# Patient Record
Sex: FEMALE | Race: WHITE | NOT HISPANIC OR LATINO | Employment: OTHER | URBAN - METROPOLITAN AREA
[De-identification: names, ages, dates, MRNs, and addresses within clinical notes are randomized per-mention and may not be internally consistent; named-entity substitution may affect disease eponyms.]

---

## 2023-03-20 DIAGNOSIS — Z79.4 TYPE 2 DIABETES MELLITUS WITH STAGE 3A CHRONIC KIDNEY DISEASE, WITH LONG-TERM CURRENT USE OF INSULIN (MULTI): Primary | ICD-10-CM

## 2023-03-20 DIAGNOSIS — E11.22 TYPE 2 DIABETES MELLITUS WITH STAGE 3A CHRONIC KIDNEY DISEASE, WITH LONG-TERM CURRENT USE OF INSULIN (MULTI): Primary | ICD-10-CM

## 2023-03-20 DIAGNOSIS — N18.31 TYPE 2 DIABETES MELLITUS WITH STAGE 3A CHRONIC KIDNEY DISEASE, WITH LONG-TERM CURRENT USE OF INSULIN (MULTI): Primary | ICD-10-CM

## 2023-03-20 PROBLEM — R53.83 FATIGUE: Status: ACTIVE | Noted: 2023-03-20

## 2023-03-20 PROBLEM — M54.31 SCIATICA OF RIGHT SIDE: Status: ACTIVE | Noted: 2023-03-20

## 2023-03-20 PROBLEM — I10 HYPERTENSION: Status: ACTIVE | Noted: 2023-03-20

## 2023-03-20 PROBLEM — N18.30 CKD STAGE 3 SECONDARY TO DIABETES (MULTI): Status: ACTIVE | Noted: 2023-03-20

## 2023-03-20 PROBLEM — E11.9 DIABETES MELLITUS, TYPE 2 (MULTI): Status: ACTIVE | Noted: 2023-03-20

## 2023-03-20 PROBLEM — M19.019 SHOULDER ARTHRITIS: Status: ACTIVE | Noted: 2023-03-20

## 2023-03-20 PROBLEM — E78.5 HYPERLIPIDEMIA: Status: ACTIVE | Noted: 2023-03-20

## 2023-03-20 RX ORDER — BLOOD-GLUCOSE METER
3 EACH MISCELLANEOUS
COMMUNITY
Start: 2021-09-10 | End: 2024-04-01 | Stop reason: SDUPTHER

## 2023-03-20 RX ORDER — INSULIN HUMAN 100 [IU]/ML
INJECTION, SUSPENSION SUBCUTANEOUS
COMMUNITY
Start: 2017-01-24 | End: 2024-01-26 | Stop reason: SDUPTHER

## 2023-03-20 RX ORDER — PRAVASTATIN SODIUM 80 MG/1
1 TABLET ORAL DAILY
COMMUNITY
Start: 2017-01-23 | End: 2024-03-04 | Stop reason: SDUPTHER

## 2023-03-20 RX ORDER — DULAGLUTIDE 1.5 MG/.5ML
1.5 INJECTION, SOLUTION SUBCUTANEOUS
COMMUNITY
Start: 2022-05-04 | End: 2023-10-13 | Stop reason: ALTCHOICE

## 2023-03-20 RX ORDER — GLIPIZIDE 10 MG/1
10 TABLET ORAL
COMMUNITY
Start: 2020-08-07 | End: 2024-05-24 | Stop reason: ALTCHOICE

## 2023-03-20 RX ORDER — PEN NEEDLE, DIABETIC 29 G X1/2"
NEEDLE, DISPOSABLE MISCELLANEOUS AS NEEDED
COMMUNITY
Start: 2023-02-06 | End: 2023-11-02

## 2023-03-20 RX ORDER — INSULIN HUMAN 100 [IU]/ML
INJECTION, SUSPENSION SUBCUTANEOUS
Qty: 40 ML | Refills: 3 | Status: SHIPPED | OUTPATIENT
Start: 2023-03-20 | End: 2024-03-19

## 2023-03-20 RX ORDER — METFORMIN HYDROCHLORIDE 1000 MG/1
1000 TABLET ORAL
COMMUNITY
Start: 2017-01-23 | End: 2023-05-29

## 2023-03-20 RX ORDER — LISINOPRIL AND HYDROCHLOROTHIAZIDE 20; 25 MG/1; MG/1
1 TABLET ORAL DAILY
COMMUNITY
Start: 2017-01-24 | End: 2024-03-04 | Stop reason: SDUPTHER

## 2023-03-21 LAB
ERYTHROCYTE DISTRIBUTION WIDTH (RATIO) BY AUTOMATED COUNT: 11.9 % (ref 11.5–14.5)
ERYTHROCYTE MEAN CORPUSCULAR HEMOGLOBIN CONCENTRATION (G/DL) BY AUTOMATED: 32.8 G/DL (ref 32–36)
ERYTHROCYTE MEAN CORPUSCULAR VOLUME (FL) BY AUTOMATED COUNT: 89 FL (ref 80–100)
ERYTHROCYTES (10*6/UL) IN BLOOD BY AUTOMATED COUNT: 4.98 X10E12/L (ref 4–5.2)
ESTIMATED AVERAGE GLUCOSE FOR HBA1C: 209 MG/DL
HEMATOCRIT (%) IN BLOOD BY AUTOMATED COUNT: 44.2 % (ref 36–46)
HEMOGLOBIN (G/DL) IN BLOOD: 14.5 G/DL (ref 12–16)
HEMOGLOBIN A1C/HEMOGLOBIN TOTAL IN BLOOD: 8.9 %
LEUKOCYTES (10*3/UL) IN BLOOD BY AUTOMATED COUNT: 11.7 X10E9/L (ref 4.4–11.3)
NRBC (PER 100 WBCS) BY AUTOMATED COUNT: 0 /100 WBC (ref 0–0)
PLATELETS (10*3/UL) IN BLOOD AUTOMATED COUNT: 338 X10E9/L (ref 150–450)

## 2023-03-22 LAB
ALANINE AMINOTRANSFERASE (SGPT) (U/L) IN SER/PLAS: 14 U/L (ref 7–45)
ALBUMIN (G/DL) IN SER/PLAS: 4.4 G/DL (ref 3.4–5)
ALBUMIN (G/DL) IN SER/PLAS: 4.4 G/DL (ref 3.4–5)
ALBUMIN (MG/L) IN URINE: <7 MG/L
ALBUMIN/CREATININE (UG/MG) IN URINE: NORMAL UG/MG CRT (ref 0–30)
ALKALINE PHOSPHATASE (U/L) IN SER/PLAS: 70 U/L (ref 33–136)
ANION GAP IN SER/PLAS: 17 MMOL/L (ref 10–20)
ANION GAP IN SER/PLAS: 18 MMOL/L (ref 10–20)
ASPARTATE AMINOTRANSFERASE (SGOT) (U/L) IN SER/PLAS: 17 U/L (ref 9–39)
BILIRUBIN TOTAL (MG/DL) IN SER/PLAS: 0.6 MG/DL (ref 0–1.2)
CALCIDIOL (25 OH VITAMIN D3) (NG/ML) IN SER/PLAS: 50 NG/ML
CALCIUM (MG/DL) IN SER/PLAS: 9.9 MG/DL (ref 8.6–10.6)
CALCIUM (MG/DL) IN SER/PLAS: 9.9 MG/DL (ref 8.6–10.6)
CARBON DIOXIDE, TOTAL (MMOL/L) IN SER/PLAS: 25 MMOL/L (ref 21–32)
CARBON DIOXIDE, TOTAL (MMOL/L) IN SER/PLAS: 26 MMOL/L (ref 21–32)
CHLORIDE (MMOL/L) IN SER/PLAS: 96 MMOL/L (ref 98–107)
CHLORIDE (MMOL/L) IN SER/PLAS: 96 MMOL/L (ref 98–107)
CHOLESTEROL (MG/DL) IN SER/PLAS: 207 MG/DL (ref 0–199)
CHOLESTEROL IN HDL (MG/DL) IN SER/PLAS: 53.8 MG/DL
CHOLESTEROL/HDL RATIO: 3.8
CREATININE (MG/DL) IN SER/PLAS: 1.7 MG/DL (ref 0.5–1.05)
CREATININE (MG/DL) IN SER/PLAS: 1.76 MG/DL (ref 0.5–1.05)
CREATININE (MG/DL) IN URINE: 136 MG/DL (ref 20–320)
GFR FEMALE: 29 ML/MIN/1.73M2
GFR FEMALE: 31 ML/MIN/1.73M2
GLUCOSE (MG/DL) IN SER/PLAS: 205 MG/DL (ref 74–99)
GLUCOSE (MG/DL) IN SER/PLAS: 205 MG/DL (ref 74–99)
LDL: 109 MG/DL (ref 0–99)
NON HDL CHOLESTEROL: 153 MG/DL
PARATHYRIN INTACT (PG/ML) IN SER/PLAS: 55 PG/ML (ref 18.5–88)
PHOSPHATE (MG/DL) IN SER/PLAS: 4.3 MG/DL (ref 2.5–4.9)
POTASSIUM (MMOL/L) IN SER/PLAS: 4.2 MMOL/L (ref 3.5–5.3)
POTASSIUM (MMOL/L) IN SER/PLAS: 4.3 MMOL/L (ref 3.5–5.3)
PROTEIN TOTAL: 7.1 G/DL (ref 6.4–8.2)
SODIUM (MMOL/L) IN SER/PLAS: 135 MMOL/L (ref 136–145)
SODIUM (MMOL/L) IN SER/PLAS: 135 MMOL/L (ref 136–145)
TRIGLYCERIDE (MG/DL) IN SER/PLAS: 223 MG/DL (ref 0–149)
UREA NITROGEN (MG/DL) IN SER/PLAS: 35 MG/DL (ref 6–23)
UREA NITROGEN (MG/DL) IN SER/PLAS: 35 MG/DL (ref 6–23)
VLDL: 45 MG/DL (ref 0–40)

## 2023-05-24 DIAGNOSIS — N18.31 TYPE 2 DIABETES MELLITUS WITH STAGE 3A CHRONIC KIDNEY DISEASE, WITH LONG-TERM CURRENT USE OF INSULIN (MULTI): Primary | ICD-10-CM

## 2023-05-24 DIAGNOSIS — E11.22 TYPE 2 DIABETES MELLITUS WITH STAGE 3A CHRONIC KIDNEY DISEASE, WITH LONG-TERM CURRENT USE OF INSULIN (MULTI): Primary | ICD-10-CM

## 2023-05-24 DIAGNOSIS — Z79.4 TYPE 2 DIABETES MELLITUS WITH STAGE 3A CHRONIC KIDNEY DISEASE, WITH LONG-TERM CURRENT USE OF INSULIN (MULTI): Primary | ICD-10-CM

## 2023-05-29 RX ORDER — METFORMIN HYDROCHLORIDE 1000 MG/1
TABLET ORAL
Qty: 90 TABLET | Refills: 3 | Status: SHIPPED | OUTPATIENT
Start: 2023-05-29 | End: 2024-05-24 | Stop reason: ALTCHOICE

## 2023-06-28 LAB
ALANINE AMINOTRANSFERASE (SGPT) (U/L) IN SER/PLAS: 10 U/L (ref 7–45)
ALBUMIN (G/DL) IN SER/PLAS: 4.3 G/DL (ref 3.4–5)
ALKALINE PHOSPHATASE (U/L) IN SER/PLAS: 67 U/L (ref 33–136)
ANION GAP IN SER/PLAS: 17 MMOL/L (ref 10–20)
ASPARTATE AMINOTRANSFERASE (SGOT) (U/L) IN SER/PLAS: 14 U/L (ref 9–39)
BILIRUBIN TOTAL (MG/DL) IN SER/PLAS: 0.5 MG/DL (ref 0–1.2)
CALCIUM (MG/DL) IN SER/PLAS: 10.4 MG/DL (ref 8.6–10.6)
CARBON DIOXIDE, TOTAL (MMOL/L) IN SER/PLAS: 27 MMOL/L (ref 21–32)
CHLORIDE (MMOL/L) IN SER/PLAS: 97 MMOL/L (ref 98–107)
CREATININE (MG/DL) IN SER/PLAS: 1.48 MG/DL (ref 0.5–1.05)
ESTIMATED AVERAGE GLUCOSE FOR HBA1C: 177 MG/DL
GFR FEMALE: 36 ML/MIN/1.73M2
GLUCOSE (MG/DL) IN SER/PLAS: 131 MG/DL (ref 74–99)
HEMOGLOBIN A1C/HEMOGLOBIN TOTAL IN BLOOD: 7.8 %
POTASSIUM (MMOL/L) IN SER/PLAS: 4.4 MMOL/L (ref 3.5–5.3)
PROTEIN TOTAL: 7.3 G/DL (ref 6.4–8.2)
SODIUM (MMOL/L) IN SER/PLAS: 137 MMOL/L (ref 136–145)
UREA NITROGEN (MG/DL) IN SER/PLAS: 34 MG/DL (ref 6–23)

## 2023-09-10 PROBLEM — R94.31 ABNORMAL EKG: Status: ACTIVE | Noted: 2023-09-10

## 2023-09-10 PROBLEM — Z78.0 MENOPAUSE: Status: ACTIVE | Noted: 2023-09-10

## 2023-09-10 RX ORDER — DULAGLUTIDE 3 MG/.5ML
INJECTION, SOLUTION SUBCUTANEOUS
COMMUNITY
Start: 2023-05-05

## 2023-09-28 LAB
ALBUMIN (MG/L) IN URINE: 9.4 MG/L
ALBUMIN/CREATININE (UG/MG) IN URINE: 6.7 UG/MG CRT (ref 0–30)
CREATININE (MG/DL) IN URINE: 141 MG/DL (ref 20–320)
ESTIMATED AVERAGE GLUCOSE FOR HBA1C: 217 MG/DL
HEMOGLOBIN A1C/HEMOGLOBIN TOTAL IN BLOOD: 9.2 %

## 2023-09-29 LAB
ALANINE AMINOTRANSFERASE (SGPT) (U/L) IN SER/PLAS: 10 U/L (ref 7–45)
ALBUMIN (G/DL) IN SER/PLAS: 4 G/DL (ref 3.4–5)
ALBUMIN (G/DL) IN SER/PLAS: 4.1 G/DL (ref 3.4–5)
ALKALINE PHOSPHATASE (U/L) IN SER/PLAS: 67 U/L (ref 33–136)
ANION GAP IN SER/PLAS: 18 MMOL/L (ref 10–20)
ANION GAP IN SER/PLAS: 18 MMOL/L (ref 10–20)
ASPARTATE AMINOTRANSFERASE (SGOT) (U/L) IN SER/PLAS: 12 U/L (ref 9–39)
BILIRUBIN TOTAL (MG/DL) IN SER/PLAS: 0.6 MG/DL (ref 0–1.2)
CALCIDIOL (25 OH VITAMIN D3) (NG/ML) IN SER/PLAS: 34 NG/ML
CALCIUM (MG/DL) IN SER/PLAS: 10.6 MG/DL (ref 8.6–10.6)
CALCIUM (MG/DL) IN SER/PLAS: 10.6 MG/DL (ref 8.6–10.6)
CARBON DIOXIDE, TOTAL (MMOL/L) IN SER/PLAS: 24 MMOL/L (ref 21–32)
CARBON DIOXIDE, TOTAL (MMOL/L) IN SER/PLAS: 25 MMOL/L (ref 21–32)
CHLORIDE (MMOL/L) IN SER/PLAS: 99 MMOL/L (ref 98–107)
CHLORIDE (MMOL/L) IN SER/PLAS: 99 MMOL/L (ref 98–107)
CREATININE (MG/DL) IN SER/PLAS: 1.46 MG/DL (ref 0.5–1.05)
CREATININE (MG/DL) IN SER/PLAS: 1.48 MG/DL (ref 0.5–1.05)
GFR FEMALE: 36 ML/MIN/1.73M2
GFR FEMALE: 37 ML/MIN/1.73M2
GLUCOSE (MG/DL) IN SER/PLAS: 224 MG/DL (ref 74–99)
GLUCOSE (MG/DL) IN SER/PLAS: 224 MG/DL (ref 74–99)
PARATHYRIN INTACT (PG/ML) IN SER/PLAS: 18.4 PG/ML (ref 18.5–88)
PHOSPHATE (MG/DL) IN SER/PLAS: 3.4 MG/DL (ref 2.5–4.9)
POTASSIUM (MMOL/L) IN SER/PLAS: 3.8 MMOL/L (ref 3.5–5.3)
POTASSIUM (MMOL/L) IN SER/PLAS: 3.8 MMOL/L (ref 3.5–5.3)
PROTEIN TOTAL: 6.9 G/DL (ref 6.4–8.2)
SODIUM (MMOL/L) IN SER/PLAS: 137 MMOL/L (ref 136–145)
SODIUM (MMOL/L) IN SER/PLAS: 138 MMOL/L (ref 136–145)
UREA NITROGEN (MG/DL) IN SER/PLAS: 31 MG/DL (ref 6–23)
UREA NITROGEN (MG/DL) IN SER/PLAS: 32 MG/DL (ref 6–23)

## 2023-10-13 ENCOUNTER — OFFICE VISIT (OUTPATIENT)
Dept: ENDOCRINOLOGY | Facility: CLINIC | Age: 78
End: 2023-10-13
Payer: MEDICARE

## 2023-10-13 VITALS
SYSTOLIC BLOOD PRESSURE: 120 MMHG | BODY MASS INDEX: 29.02 KG/M2 | HEART RATE: 68 BPM | WEIGHT: 174.2 LBS | HEIGHT: 65 IN | DIASTOLIC BLOOD PRESSURE: 70 MMHG | RESPIRATION RATE: 16 BRPM

## 2023-10-13 DIAGNOSIS — Z79.4 TYPE 2 DIABETES MELLITUS WITHOUT COMPLICATION, WITH LONG-TERM CURRENT USE OF INSULIN (MULTI): Primary | ICD-10-CM

## 2023-10-13 DIAGNOSIS — E11.9 TYPE 2 DIABETES MELLITUS WITHOUT COMPLICATION, WITH LONG-TERM CURRENT USE OF INSULIN (MULTI): Primary | ICD-10-CM

## 2023-10-13 DIAGNOSIS — I10 HYPERTENSION, UNSPECIFIED TYPE: ICD-10-CM

## 2023-10-13 DIAGNOSIS — E78.5 HYPERLIPIDEMIA, UNSPECIFIED HYPERLIPIDEMIA TYPE: ICD-10-CM

## 2023-10-13 PROCEDURE — 1160F RVW MEDS BY RX/DR IN RCRD: CPT | Performed by: INTERNAL MEDICINE

## 2023-10-13 PROCEDURE — 3074F SYST BP LT 130 MM HG: CPT | Performed by: INTERNAL MEDICINE

## 2023-10-13 PROCEDURE — 99214 OFFICE O/P EST MOD 30 MIN: CPT | Performed by: INTERNAL MEDICINE

## 2023-10-13 PROCEDURE — 3078F DIAST BP <80 MM HG: CPT | Performed by: INTERNAL MEDICINE

## 2023-10-13 PROCEDURE — 1036F TOBACCO NON-USER: CPT | Performed by: INTERNAL MEDICINE

## 2023-10-13 PROCEDURE — 1159F MED LIST DOCD IN RCRD: CPT | Performed by: INTERNAL MEDICINE

## 2023-10-13 ASSESSMENT — ENCOUNTER SYMPTOMS
VOMITING: 0
CHILLS: 0
FATIGUE: 0
NAUSEA: 0
COUGH: 0
PALPITATIONS: 0
FEVER: 0
DIARRHEA: 0
HEADACHES: 0
SHORTNESS OF BREATH: 0

## 2023-10-13 NOTE — PATIENT INSTRUCTIONS
Cut back on fruit  Take glipizide with breakfast and dinner  Follow up in 3 months  Do not refill NPH, let us know if low and will switch insulin type

## 2023-10-13 NOTE — PROGRESS NOTES
"Subjective   Patient ID: Verenice Ruiz is a 78 y.o. female who presents for Diabetes, Hyperlipidemia, and Hypertension.  HPI  Current on trulicity 3 mg  Metformin 1000 every day  Nph 30 am only   Glipizide bid  Since last visit increased glipizide to bid but somehow decided to take at lunch and hs....feels low in the middle of the night sometimes and during the day sugars very high.   Admits also eating a lot of fruit over the summer.  Still active with rescue dog    Review of Systems   Constitutional:  Negative for chills, fatigue and fever.   Respiratory:  Negative for cough and shortness of breath.    Cardiovascular:  Negative for chest pain and palpitations.   Gastrointestinal:  Negative for diarrhea, nausea and vomiting.   Neurological:  Negative for headaches.       Objective    10/13/2023 12:48 PM   /70   Pulse 68   Resp 16   Weight 79 kg (174 lb 3.2 oz)   Height 1.651 m (5' 5\")       Physical Exam  Constitutional:       Appearance: Normal appearance. She is overweight.   HENT:      Head: Normocephalic and atraumatic.   Neck:      Thyroid: No thyroid mass, thyromegaly or thyroid tenderness.   Cardiovascular:      Rate and Rhythm: Normal rate and regular rhythm.      Heart sounds: No murmur heard.     No gallop.   Pulmonary:      Effort: Pulmonary effort is normal.      Breath sounds: Normal breath sounds.   Abdominal:      Palpations: Abdomen is soft.      Comments: benign   Neurological:      General: No focal deficit present.      Mental Status: She is alert and oriented to person, place, and time.      Deep Tendon Reflexes: Reflexes are normal and symmetric.   Psychiatric:         Behavior: Behavior is cooperative.         Assessment/Plan   Problem List Items Addressed This Visit             ICD-10-CM    Diabetes mellitus, type 2 (CMS/HCC) - Primary E11.9    Relevant Orders    Hemoglobin A1C    Comprehensive Metabolic Panel    Hyperlipidemia E78.5    Hypertension I10   Dm2:  A1c way up at 9.2 " due to during the day numbers but feels low at night  Discussed importance of checking when feeling low, also needs to cut back on fruit  Reviewed improved long acting insulins, wants to use up NPH first and has 8 vials  Sglt2 and option, but will first move glipizide to breakfast and dinner and work on eating  Htn:  Bp controlled  Lipids:  Continue statin  Follow up in 3 months

## 2023-11-02 DIAGNOSIS — Z79.4 TYPE 2 DIABETES MELLITUS WITH OTHER SPECIFIED COMPLICATION, WITH LONG-TERM CURRENT USE OF INSULIN (MULTI): Primary | ICD-10-CM

## 2023-11-02 DIAGNOSIS — E11.69 TYPE 2 DIABETES MELLITUS WITH OTHER SPECIFIED COMPLICATION, WITH LONG-TERM CURRENT USE OF INSULIN (MULTI): Primary | ICD-10-CM

## 2023-11-02 RX ORDER — PEN NEEDLE, DIABETIC 29 G X1/2"
NEEDLE, DISPOSABLE MISCELLANEOUS
Qty: 180 EACH | Refills: 3 | Status: SHIPPED | OUTPATIENT
Start: 2023-11-02

## 2024-01-24 ENCOUNTER — LAB (OUTPATIENT)
Dept: LAB | Facility: LAB | Age: 79
End: 2024-01-24
Payer: MEDICARE

## 2024-01-24 DIAGNOSIS — E11.9 TYPE 2 DIABETES MELLITUS WITHOUT COMPLICATION, WITH LONG-TERM CURRENT USE OF INSULIN (MULTI): ICD-10-CM

## 2024-01-24 DIAGNOSIS — Z79.4 TYPE 2 DIABETES MELLITUS WITHOUT COMPLICATION, WITH LONG-TERM CURRENT USE OF INSULIN (MULTI): ICD-10-CM

## 2024-01-24 LAB
ALBUMIN SERPL BCP-MCNC: 4 G/DL (ref 3.4–5)
ALP SERPL-CCNC: 71 U/L (ref 33–136)
ALT SERPL W P-5'-P-CCNC: 10 U/L (ref 7–45)
ANION GAP SERPL CALC-SCNC: 15 MMOL/L (ref 10–20)
AST SERPL W P-5'-P-CCNC: 13 U/L (ref 9–39)
BILIRUB SERPL-MCNC: 0.4 MG/DL (ref 0–1.2)
BUN SERPL-MCNC: 38 MG/DL (ref 6–23)
CALCIUM SERPL-MCNC: 9.7 MG/DL (ref 8.6–10.6)
CHLORIDE SERPL-SCNC: 95 MMOL/L (ref 98–107)
CO2 SERPL-SCNC: 27 MMOL/L (ref 21–32)
CREAT SERPL-MCNC: 1.82 MG/DL (ref 0.5–1.05)
EGFRCR SERPLBLD CKD-EPI 2021: 28 ML/MIN/1.73M*2
EST. AVERAGE GLUCOSE BLD GHB EST-MCNC: 203 MG/DL
GLUCOSE SERPL-MCNC: 339 MG/DL (ref 74–99)
HBA1C MFR BLD: 8.7 %
POTASSIUM SERPL-SCNC: 4.5 MMOL/L (ref 3.5–5.3)
PROT SERPL-MCNC: 7 G/DL (ref 6.4–8.2)
SODIUM SERPL-SCNC: 132 MMOL/L (ref 136–145)

## 2024-01-24 PROCEDURE — 36415 COLL VENOUS BLD VENIPUNCTURE: CPT

## 2024-01-24 PROCEDURE — 80053 COMPREHEN METABOLIC PANEL: CPT

## 2024-01-24 PROCEDURE — 83036 HEMOGLOBIN GLYCOSYLATED A1C: CPT

## 2024-01-26 ENCOUNTER — OFFICE VISIT (OUTPATIENT)
Dept: ENDOCRINOLOGY | Facility: CLINIC | Age: 79
End: 2024-01-26
Payer: MEDICARE

## 2024-01-26 VITALS
WEIGHT: 169.4 LBS | RESPIRATION RATE: 16 BRPM | SYSTOLIC BLOOD PRESSURE: 120 MMHG | HEART RATE: 92 BPM | DIASTOLIC BLOOD PRESSURE: 70 MMHG | BODY MASS INDEX: 28.22 KG/M2 | HEIGHT: 65 IN

## 2024-01-26 DIAGNOSIS — E11.9 TYPE 2 DIABETES MELLITUS WITHOUT COMPLICATION, UNSPECIFIED WHETHER LONG TERM INSULIN USE (MULTI): Primary | ICD-10-CM

## 2024-01-26 DIAGNOSIS — I10 HYPERTENSION, UNSPECIFIED TYPE: ICD-10-CM

## 2024-01-26 DIAGNOSIS — E78.5 HYPERLIPIDEMIA, UNSPECIFIED HYPERLIPIDEMIA TYPE: ICD-10-CM

## 2024-01-26 PROCEDURE — 1036F TOBACCO NON-USER: CPT | Performed by: INTERNAL MEDICINE

## 2024-01-26 PROCEDURE — 99214 OFFICE O/P EST MOD 30 MIN: CPT | Performed by: INTERNAL MEDICINE

## 2024-01-26 PROCEDURE — 1160F RVW MEDS BY RX/DR IN RCRD: CPT | Performed by: INTERNAL MEDICINE

## 2024-01-26 PROCEDURE — 3074F SYST BP LT 130 MM HG: CPT | Performed by: INTERNAL MEDICINE

## 2024-01-26 PROCEDURE — 1159F MED LIST DOCD IN RCRD: CPT | Performed by: INTERNAL MEDICINE

## 2024-01-26 PROCEDURE — 3078F DIAST BP <80 MM HG: CPT | Performed by: INTERNAL MEDICINE

## 2024-01-26 ASSESSMENT — ENCOUNTER SYMPTOMS
FEVER: 0
COUGH: 0
VOMITING: 0
CHILLS: 0
FATIGUE: 0
NAUSEA: 0
DIARRHEA: 0
HEADACHES: 0
PALPITATIONS: 0
SHORTNESS OF BREATH: 0

## 2024-01-26 NOTE — PROGRESS NOTES
"Endocrinology: Follow up visit  Subjective   Patient ID: Verenice Ruiz is a 78 y.o. female who presents for Diabetes (Type 2 ), Hyperlipidemia, and Hypertension.    PCP: Nyla Khan MD    HPI    Current on trulicity 3 mg  Metformin 1000 every day  Nph 30 am only   Glipizide bid  Last visit discussed proper timing of glipizide.  Today admits forgetting second dose completely.  Am sugars are ok but hs numbers in 200-300 often.  Feels ok.  No complaints.  Off track with eating over the winter  Review of Systems   Constitutional:  Negative for chills, fatigue and fever.   Respiratory:  Negative for cough and shortness of breath.    Cardiovascular:  Negative for chest pain and palpitations.   Gastrointestinal:  Negative for diarrhea, nausea and vomiting.   Neurological:  Negative for headaches.       Patient Active Problem List   Diagnosis    CKD stage 3 secondary to diabetes (CMS/HCC)    Diabetes mellitus, type 2 (CMS/HCC)    Fatigue    Hyperlipidemia    Hypertension    Sciatica of right side    Shoulder arthritis    Abnormal EKG    Menopause        Home Meds:  Current Outpatient Medications   Medication Instructions    BD Insulin Syringe Ultra-Fine 0.5 mL 31 gauge x 5/16\" syringe USE WITH INSULIN TWICE A DAY    glipiZIDE (GLUCOTROL) 10 mg, oral, 2 times daily before meals    HumuLIN N NPH U-100 Insulin 100 unit/mL injection subcutaneous, 2 times daily before meals    insulin NPH, Isophane, (HumuLIN N NPH U-100 Insulin) 100 unit/mL injection INJECT 30 UNITS IN THE MORNING AND 14 UNITS IN THE EVENING    lisinopriL-hydrochlorothiazide 20-25 mg tablet 1 tablet, oral, Daily    metFORMIN (Glucophage) 1,000 mg tablet TAKE 1 TABLET DAILY ONLY    OneTouch Verio test strips 3 %    pravastatin (Pravachol) 80 mg tablet 1 tablet, oral, Daily    Trulicity 3 mg/0.5 mL pen injector Use as directed.        Allergies   Allergen Reactions    Allerg Xt,D.Farinae-D.Pteronys Other    Cat Hair Standardized Allergenic Extract Other    " "Rosuvastatin Other     Severe myalgia    Simvastatin Other     Severe shoulder upper arm pain bilateral        Objective   Vitals:    01/26/24 1333   BP: 120/70   Pulse: 92   Resp: 16      Vitals:    01/26/24 1333   Weight: 76.8 kg (169 lb 6.4 oz)      Body mass index is 28.19 kg/m².   Physical Exam  Constitutional:       Appearance: Normal appearance. She is overweight.   HENT:      Head: Normocephalic and atraumatic.   Neck:      Thyroid: No thyroid mass, thyromegaly or thyroid tenderness.   Cardiovascular:      Rate and Rhythm: Normal rate and regular rhythm.      Heart sounds: No murmur heard.     No gallop.   Pulmonary:      Effort: Pulmonary effort is normal.      Breath sounds: Normal breath sounds.   Abdominal:      Palpations: Abdomen is soft.      Comments: benign   Neurological:      General: No focal deficit present.      Mental Status: She is alert and oriented to person, place, and time.      Deep Tendon Reflexes: Reflexes are normal and symmetric.   Psychiatric:         Behavior: Behavior is cooperative.         Labs:  Lab Results   Component Value Date    HGBA1C 8.7 (H) 01/24/2024    LDLDIRECT 105 04/19/2018    TSH 1.04 09/09/2021      No results found for: \"PR1\", \"THYROIDPAB\", \"TSI\"     Assessment/Plan   Problem List Items Addressed This Visit       Diabetes mellitus, type 2 (CMS/HCC) - Primary    Hyperlipidemia    Hypertension   Dm2:  A1c improved at 8.7   Feels low overnight so will reduce NPH to 26 but must set reminder to take pm glipizde.    We have discussed nph but still using up supply of it  Sglt2 an option, but will first move glipizide to bid  Htn:  Bp controlled  Lipids:  Continue statin  Follow up in 3 months    Electronically signed by:  Isabel Villegas MD 01/26/24 1:33 PM              "

## 2024-03-04 DIAGNOSIS — I10 PRIMARY HYPERTENSION: ICD-10-CM

## 2024-03-04 DIAGNOSIS — E78.5 HYPERLIPIDEMIA, UNSPECIFIED HYPERLIPIDEMIA TYPE: Primary | ICD-10-CM

## 2024-03-06 RX ORDER — LISINOPRIL AND HYDROCHLOROTHIAZIDE 20; 25 MG/1; MG/1
1 TABLET ORAL DAILY
Qty: 90 TABLET | Refills: 1 | Status: SHIPPED | OUTPATIENT
Start: 2024-03-06 | End: 2024-03-22 | Stop reason: SDUPTHER

## 2024-03-06 RX ORDER — PRAVASTATIN SODIUM 80 MG/1
80 TABLET ORAL DAILY
Qty: 90 TABLET | Refills: 1 | Status: SHIPPED | OUTPATIENT
Start: 2024-03-06 | End: 2024-03-22 | Stop reason: SDUPTHER

## 2024-03-07 ENCOUNTER — OFFICE VISIT (OUTPATIENT)
Dept: PRIMARY CARE | Facility: CLINIC | Age: 79
End: 2024-03-07
Payer: MEDICARE

## 2024-03-07 DIAGNOSIS — Z00.00 MEDICARE ANNUAL WELLNESS VISIT, SUBSEQUENT: Primary | ICD-10-CM

## 2024-03-07 DIAGNOSIS — E11.22 CKD STAGE 3 SECONDARY TO DIABETES (MULTI): ICD-10-CM

## 2024-03-07 DIAGNOSIS — M19.041 LOCALIZED OSTEOARTHRITIS OF BOTH HANDS: ICD-10-CM

## 2024-03-07 DIAGNOSIS — Z78.0 MENOPAUSE: ICD-10-CM

## 2024-03-07 DIAGNOSIS — E11.9 TYPE 2 DIABETES MELLITUS WITHOUT COMPLICATION, UNSPECIFIED WHETHER LONG TERM INSULIN USE (MULTI): ICD-10-CM

## 2024-03-07 DIAGNOSIS — N18.30 CKD STAGE 3 SECONDARY TO DIABETES (MULTI): ICD-10-CM

## 2024-03-07 DIAGNOSIS — E78.5 HYPERLIPIDEMIA, UNSPECIFIED HYPERLIPIDEMIA TYPE: ICD-10-CM

## 2024-03-07 DIAGNOSIS — Z00.00 PHYSICAL EXAM: ICD-10-CM

## 2024-03-07 DIAGNOSIS — M19.042 LOCALIZED OSTEOARTHRITIS OF BOTH HANDS: ICD-10-CM

## 2024-03-07 DIAGNOSIS — Z12.31 ENCOUNTER FOR SCREENING MAMMOGRAM FOR MALIGNANT NEOPLASM OF BREAST: ICD-10-CM

## 2024-03-07 DIAGNOSIS — I10 HYPERTENSION, UNSPECIFIED TYPE: ICD-10-CM

## 2024-03-07 PROBLEM — J30.89 ENVIRONMENTAL AND SEASONAL ALLERGIES: Status: ACTIVE | Noted: 2019-05-09

## 2024-03-07 PROBLEM — H18.523 CORNEAL EPITHELIAL BASEMENT MEMBRANE DYSTROPHY OF BOTH EYES: Status: ACTIVE | Noted: 2021-01-14

## 2024-03-07 PROBLEM — M25.519 SHOULDER PAIN: Status: ACTIVE | Noted: 2024-03-07

## 2024-03-07 PROCEDURE — 3075F SYST BP GE 130 - 139MM HG: CPT | Performed by: INTERNAL MEDICINE

## 2024-03-07 PROCEDURE — G0439 PPPS, SUBSEQ VISIT: HCPCS | Performed by: INTERNAL MEDICINE

## 2024-03-07 PROCEDURE — 3079F DIAST BP 80-89 MM HG: CPT | Performed by: INTERNAL MEDICINE

## 2024-03-07 PROCEDURE — 99397 PER PM REEVAL EST PAT 65+ YR: CPT | Performed by: INTERNAL MEDICINE

## 2024-03-07 PROCEDURE — 1160F RVW MEDS BY RX/DR IN RCRD: CPT | Performed by: INTERNAL MEDICINE

## 2024-03-07 PROCEDURE — 1159F MED LIST DOCD IN RCRD: CPT | Performed by: INTERNAL MEDICINE

## 2024-03-07 PROCEDURE — 1170F FXNL STATUS ASSESSED: CPT | Performed by: INTERNAL MEDICINE

## 2024-03-07 PROCEDURE — 1036F TOBACCO NON-USER: CPT | Performed by: INTERNAL MEDICINE

## 2024-03-07 ASSESSMENT — ACTIVITIES OF DAILY LIVING (ADL)
MANAGING_FINANCES: INDEPENDENT
BATHING: INDEPENDENT
TAKING_MEDICATION: INDEPENDENT
DRESSING: INDEPENDENT
GROCERY_SHOPPING: INDEPENDENT
DOING_HOUSEWORK: INDEPENDENT

## 2024-03-07 ASSESSMENT — PATIENT HEALTH QUESTIONNAIRE - PHQ9
SUM OF ALL RESPONSES TO PHQ9 QUESTIONS 1 AND 2: 0
2. FEELING DOWN, DEPRESSED OR HOPELESS: NOT AT ALL
1. LITTLE INTEREST OR PLEASURE IN DOING THINGS: NOT AT ALL

## 2024-03-08 ENCOUNTER — HOSPITAL ENCOUNTER (OUTPATIENT)
Dept: RADIOLOGY | Facility: CLINIC | Age: 79
Discharge: HOME | End: 2024-03-08
Payer: MEDICARE

## 2024-03-08 VITALS — BODY MASS INDEX: 30.48 KG/M2 | WEIGHT: 172 LBS | HEIGHT: 63 IN

## 2024-03-08 VITALS
HEIGHT: 64 IN | WEIGHT: 172 LBS | BODY MASS INDEX: 29.37 KG/M2 | SYSTOLIC BLOOD PRESSURE: 134 MMHG | OXYGEN SATURATION: 96 % | TEMPERATURE: 97.3 F | HEART RATE: 86 BPM | DIASTOLIC BLOOD PRESSURE: 80 MMHG

## 2024-03-08 DIAGNOSIS — Z12.31 ENCOUNTER FOR SCREENING MAMMOGRAM FOR MALIGNANT NEOPLASM OF BREAST: ICD-10-CM

## 2024-03-08 DIAGNOSIS — Z78.0 MENOPAUSE: ICD-10-CM

## 2024-03-08 PROCEDURE — 77067 SCR MAMMO BI INCL CAD: CPT | Performed by: RADIOLOGY

## 2024-03-08 PROCEDURE — 77080 DXA BONE DENSITY AXIAL: CPT

## 2024-03-08 PROCEDURE — 77067 SCR MAMMO BI INCL CAD: CPT

## 2024-03-08 PROCEDURE — 77080 DXA BONE DENSITY AXIAL: CPT | Performed by: RADIOLOGY

## 2024-03-08 PROCEDURE — 77063 BREAST TOMOSYNTHESIS BI: CPT | Performed by: RADIOLOGY

## 2024-03-08 NOTE — PROGRESS NOTES
"SUBJECTIVE:   Verenice Ruiz is a 78 y.o. female presenting for her annual physical/wellness.  PCP: Nyla Khan MD  Medicare wellness physical.  Sees Dr. Villegas for DM.  Sees nephrologist for CKD.  Sees eye doctor regularly  She has some pain in both thumbs when have to use hands firmly.   No hand joint redness, mild swelling.    Colon screening:  na  Last pap: na  Last mammogram: due  Dexa due  Vaccines will get RSV, tdap at pharmacy  Diet:   healthy in general  Exercises:  regularly  Lab Results   Component Value Date    HGBA1C 8.7 (H) 01/24/2024     Lab Results   Component Value Date    CREATININE 1.82 (H) 01/24/2024     Lab Results   Component Value Date    WBC 11.7 (H) 03/21/2023    HGB 14.5 03/21/2023    HCT 44.2 03/21/2023    MCV 89 03/21/2023     03/21/2023     Lab Results   Component Value Date    CHOL 207 (H) 03/21/2023    CHOL 189 12/15/2021    CHOL 160 02/02/2021     Lab Results   Component Value Date    HDL 53.8 03/21/2023    HDL 42.3 12/15/2021    HDL 48.0 02/02/2021     No results found for: \"LDLCALC\"  Lab Results   Component Value Date    TRIG 223 (H) 03/21/2023    TRIG 241 (H) 12/15/2021    TRIG 175 (H) 02/02/2021     No components found for: \"CHOLHDL\"       ROS:   in general Feeling well. No dyspnea or chest pain on exertion. No abdominal pain, change in bowel habits, black or bloody stools. No urinary tract or  symptoms.  No breast concerns. No neurological complaints.    OBJECTIVE:   The patient appears well, alert, oriented x 3, in no distress.   /80   Pulse 86   Temp 36.3 °C (97.3 °F)   Ht 1.613 m (5' 3.5\")   Wt 78 kg (172 lb)   SpO2 96%   BMI 29.99 kg/m²   ENT normal.  Neck supple. No adenopathy or thyromegaly. NANETTE. Lungs are clear, good air entry, no wheezes, rhonchi or rales. S1 and S2 normal, no murmurs, regular rate and rhythm. Abdomen is soft without tenderness, guarding, mass or organomegaly.  exam deferred to Gyn. Extremities show no edema, normal " peripheral pulses. Neurological is normal without focal findings.      1. Medicare annual wellness visit, subsequent        2. Physical exam        3. Encounter for screening mammogram for malignant neoplasm of breast  BI mammo bilateral screening tomosynthesis      4. Menopause  XR DEXA bone density      5. Hyperlipidemia, unspecified hyperlipidemia type        6. Type 2 diabetes mellitus without complication, unspecified whether long term insulin use (CMS/[facility])        7. Hypertension, unspecified type        8. CKD stage 3 secondary to diabetes (CMS/[facility])        9. Localized osteoarthritis of both hands

## 2024-03-22 DIAGNOSIS — I10 PRIMARY HYPERTENSION: ICD-10-CM

## 2024-03-22 DIAGNOSIS — E78.5 HYPERLIPIDEMIA, UNSPECIFIED HYPERLIPIDEMIA TYPE: ICD-10-CM

## 2024-03-22 RX ORDER — PRAVASTATIN SODIUM 80 MG/1
80 TABLET ORAL DAILY
Qty: 90 TABLET | Refills: 1 | Status: SHIPPED | OUTPATIENT
Start: 2024-03-22 | End: 2025-03-22

## 2024-03-22 RX ORDER — LISINOPRIL AND HYDROCHLOROTHIAZIDE 20; 25 MG/1; MG/1
1 TABLET ORAL DAILY
Qty: 90 TABLET | Refills: 1 | Status: SHIPPED | OUTPATIENT
Start: 2024-03-22

## 2024-04-01 DIAGNOSIS — E11.9 TYPE 2 DIABETES MELLITUS WITHOUT COMPLICATION, UNSPECIFIED WHETHER LONG TERM INSULIN USE (MULTI): ICD-10-CM

## 2024-04-01 RX ORDER — BLOOD-GLUCOSE METER
EACH MISCELLANEOUS
Qty: 300 STRIP | Refills: 3 | Status: SHIPPED | OUTPATIENT
Start: 2024-04-01

## 2024-05-17 ENCOUNTER — LAB (OUTPATIENT)
Dept: LAB | Facility: LAB | Age: 79
End: 2024-05-17
Payer: MEDICARE

## 2024-05-17 DIAGNOSIS — E11.9 TYPE 2 DIABETES MELLITUS WITHOUT COMPLICATION, UNSPECIFIED WHETHER LONG TERM INSULIN USE (MULTI): ICD-10-CM

## 2024-05-17 LAB
ALBUMIN SERPL BCP-MCNC: 4.3 G/DL (ref 3.4–5)
ALP SERPL-CCNC: 78 U/L (ref 33–136)
ALT SERPL W P-5'-P-CCNC: 14 U/L (ref 7–45)
ANION GAP SERPL CALC-SCNC: 16 MMOL/L (ref 10–20)
AST SERPL W P-5'-P-CCNC: 12 U/L (ref 9–39)
BILIRUB SERPL-MCNC: 0.4 MG/DL (ref 0–1.2)
BUN SERPL-MCNC: 31 MG/DL (ref 6–23)
CALCIUM SERPL-MCNC: 10.3 MG/DL (ref 8.6–10.6)
CHLORIDE SERPL-SCNC: 95 MMOL/L (ref 98–107)
CHOLEST SERPL-MCNC: 211 MG/DL (ref 0–199)
CHOLESTEROL/HDL RATIO: 3.9
CO2 SERPL-SCNC: 28 MMOL/L (ref 21–32)
CREAT SERPL-MCNC: 1.91 MG/DL (ref 0.5–1.05)
CREAT UR-MCNC: 102 MG/DL (ref 20–320)
EGFRCR SERPLBLD CKD-EPI 2021: 27 ML/MIN/1.73M*2
ERYTHROCYTE [DISTWIDTH] IN BLOOD BY AUTOMATED COUNT: 12 % (ref 11.5–14.5)
EST. AVERAGE GLUCOSE BLD GHB EST-MCNC: 214 MG/DL
GLUCOSE SERPL-MCNC: 217 MG/DL (ref 74–99)
HBA1C MFR BLD: 9.1 %
HCT VFR BLD AUTO: 43.1 % (ref 36–46)
HDLC SERPL-MCNC: 54.3 MG/DL
HGB BLD-MCNC: 14.5 G/DL (ref 12–16)
LDLC SERPL CALC-MCNC: 84 MG/DL
MCH RBC QN AUTO: 29.2 PG (ref 26–34)
MCHC RBC AUTO-ENTMCNC: 33.6 G/DL (ref 32–36)
MCV RBC AUTO: 87 FL (ref 80–100)
MICROALBUMIN UR-MCNC: 13 MG/L
MICROALBUMIN/CREAT UR: 12.7 UG/MG CREAT
NON HDL CHOLESTEROL: 157 MG/DL (ref 0–149)
NRBC BLD-RTO: 0 /100 WBCS (ref 0–0)
PLATELET # BLD AUTO: 364 X10*3/UL (ref 150–450)
POTASSIUM SERPL-SCNC: 4.4 MMOL/L (ref 3.5–5.3)
PROT SERPL-MCNC: 7.1 G/DL (ref 6.4–8.2)
RBC # BLD AUTO: 4.96 X10*6/UL (ref 4–5.2)
SODIUM SERPL-SCNC: 135 MMOL/L (ref 136–145)
TRIGL SERPL-MCNC: 363 MG/DL (ref 0–149)
VLDL: 73 MG/DL (ref 0–40)
WBC # BLD AUTO: 13.4 X10*3/UL (ref 4.4–11.3)

## 2024-05-17 PROCEDURE — 80053 COMPREHEN METABOLIC PANEL: CPT

## 2024-05-17 PROCEDURE — 83036 HEMOGLOBIN GLYCOSYLATED A1C: CPT

## 2024-05-17 PROCEDURE — 36415 COLL VENOUS BLD VENIPUNCTURE: CPT

## 2024-05-17 PROCEDURE — 85027 COMPLETE CBC AUTOMATED: CPT

## 2024-05-17 PROCEDURE — 80061 LIPID PANEL: CPT

## 2024-05-17 PROCEDURE — 82570 ASSAY OF URINE CREATININE: CPT

## 2024-05-17 PROCEDURE — 82043 UR ALBUMIN QUANTITATIVE: CPT

## 2024-05-24 ENCOUNTER — OFFICE VISIT (OUTPATIENT)
Dept: ENDOCRINOLOGY | Facility: CLINIC | Age: 79
End: 2024-05-24
Payer: MEDICARE

## 2024-05-24 ENCOUNTER — TELEPHONE (OUTPATIENT)
Dept: PRIMARY CARE | Facility: CLINIC | Age: 79
End: 2024-05-24

## 2024-05-24 VITALS
SYSTOLIC BLOOD PRESSURE: 120 MMHG | HEIGHT: 63 IN | RESPIRATION RATE: 16 BRPM | BODY MASS INDEX: 30.44 KG/M2 | DIASTOLIC BLOOD PRESSURE: 70 MMHG | HEART RATE: 70 BPM | WEIGHT: 171.8 LBS

## 2024-05-24 DIAGNOSIS — E11.9 TYPE 2 DIABETES MELLITUS WITHOUT COMPLICATION, UNSPECIFIED WHETHER LONG TERM INSULIN USE (MULTI): Primary | ICD-10-CM

## 2024-05-24 DIAGNOSIS — E78.5 HYPERLIPIDEMIA, UNSPECIFIED HYPERLIPIDEMIA TYPE: ICD-10-CM

## 2024-05-24 DIAGNOSIS — I10 HYPERTENSION, UNSPECIFIED TYPE: ICD-10-CM

## 2024-05-24 PROCEDURE — 3078F DIAST BP <80 MM HG: CPT | Performed by: INTERNAL MEDICINE

## 2024-05-24 PROCEDURE — 1160F RVW MEDS BY RX/DR IN RCRD: CPT | Performed by: INTERNAL MEDICINE

## 2024-05-24 PROCEDURE — 1159F MED LIST DOCD IN RCRD: CPT | Performed by: INTERNAL MEDICINE

## 2024-05-24 PROCEDURE — 1036F TOBACCO NON-USER: CPT | Performed by: INTERNAL MEDICINE

## 2024-05-24 PROCEDURE — 3074F SYST BP LT 130 MM HG: CPT | Performed by: INTERNAL MEDICINE

## 2024-05-24 PROCEDURE — 99214 OFFICE O/P EST MOD 30 MIN: CPT | Performed by: INTERNAL MEDICINE

## 2024-05-24 RX ORDER — FLASH GLUCOSE SENSOR
KIT MISCELLANEOUS
Qty: 6 EACH | Refills: 3 | Status: SHIPPED | OUTPATIENT
Start: 2024-05-24

## 2024-05-24 RX ORDER — INSULIN LISPRO 100 [IU]/ML
6 INJECTION, SOLUTION INTRAVENOUS; SUBCUTANEOUS 2 TIMES DAILY
Qty: 10.8 ML | Refills: 1 | Status: SHIPPED | OUTPATIENT
Start: 2024-05-24 | End: 2025-05-24

## 2024-05-24 RX ORDER — FLASH GLUCOSE SCANNING READER
EACH MISCELLANEOUS
Qty: 1 EACH | Refills: 0 | Status: SHIPPED | OUTPATIENT
Start: 2024-05-24

## 2024-05-24 RX ORDER — PEN NEEDLE, DIABETIC 30 GX3/16"
NEEDLE, DISPOSABLE MISCELLANEOUS
Qty: 200 EACH | Refills: 1 | Status: SHIPPED | OUTPATIENT
Start: 2024-05-24

## 2024-05-24 ASSESSMENT — ENCOUNTER SYMPTOMS
HEADACHES: 0
SHORTNESS OF BREATH: 0
COUGH: 0
FATIGUE: 0
DIARRHEA: 0
FEVER: 0
VOMITING: 0
PALPITATIONS: 0
CHILLS: 0
NAUSEA: 0

## 2024-05-24 NOTE — PATIENT INSTRUCTIONS
Stop metformin  Stop glipizide  Start humalog 6 units twice daily with meal  Continue nph at 26 units  Continue trulicity  Call in sugars in a week on meal insulin  Follow upin 3 months

## 2024-05-24 NOTE — PROGRESS NOTES
"Endocrinology: Follow up visit  Subjective   Patient ID: Verenice Ruiz is a 78 y.o. female who presents for Diabetes (Type 2 ), Hyperlipidemia, and Hypertension.    PCP: Nyla Khan MD    HPI  Since last visit sugars up and A1c now at 9.1.   kidney function is declining as well.   Taking meds as directed.   No lows.  Feeling fine overall    Checks sugars 4x a day  Injects insulin 3x a day    Review of Systems   Constitutional:  Negative for chills, fatigue and fever.   Respiratory:  Negative for cough and shortness of breath.    Cardiovascular:  Negative for chest pain and palpitations.   Gastrointestinal:  Negative for diarrhea, nausea and vomiting.   Neurological:  Negative for headaches.       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    Environmental and seasonal allergies    Nuclear senile cataract of both eyes    Shoulder pain        Home Meds:  Current Outpatient Medications   Medication Instructions    BD Insulin Syringe Ultra-Fine 0.5 mL 31 gauge x 5/16\" syringe USE WITH INSULIN TWICE A DAY    glipiZIDE (GLUCOTROL) 10 mg, oral, 2 times daily before meals    insulin NPH, Isophane, (HumuLIN N NPH U-100 Insulin) 100 unit/mL injection INJECT 30 UNITS IN THE MORNING AND 14 UNITS IN THE EVENING    lisinopriL-hydrochlorothiazide 20-25 mg tablet 1 tablet, oral, Daily    metFORMIN (Glucophage) 1,000 mg tablet TAKE 1 TABLET DAILY ONLY    OneTouch Verio test strips strip USE TO TEST THREE TIMES DAILY    pravastatin (PRAVACHOL) 80 mg, oral, Daily    Trulicity 3 mg/0.5 mL pen injector Use as directed.        Allergies   Allergen Reactions    Allerg Xt,D.Farinae-D.Pteronys Other    Cat Hair Standardized Allergenic Extract Other    Rosuvastatin Other     " "Severe myalgia    Simvastatin Other     Severe shoulder upper arm pain bilateral        Objective   Vitals:    05/24/24 1507   BP: 120/70   Pulse: 70   Resp: 16      Vitals:    05/24/24 1507   Weight: 77.9 kg (171 lb 12.8 oz)      Body mass index is 30.43 kg/m².   Physical Exam  Constitutional:       Appearance: Normal appearance. She is overweight.   HENT:      Head: Normocephalic and atraumatic.   Neck:      Thyroid: No thyroid mass, thyromegaly or thyroid tenderness.   Cardiovascular:      Rate and Rhythm: Normal rate and regular rhythm.      Heart sounds: No murmur heard.     No gallop.   Pulmonary:      Effort: Pulmonary effort is normal.      Breath sounds: Normal breath sounds.   Abdominal:      Palpations: Abdomen is soft.      Comments: benign   Neurological:      General: No focal deficit present.      Mental Status: She is alert and oriented to person, place, and time.      Deep Tendon Reflexes: Reflexes are normal and symmetric.   Psychiatric:         Behavior: Behavior is cooperative.         Labs:  Lab Results   Component Value Date    HGBA1C 9.1 (H) 05/17/2024    LDLDIRECT 105 04/19/2018    TSH 1.04 09/09/2021      No results found for: \"PR1\", \"THYROIDPAB\", \"TSI\"     Assessment/Plan   Problem List Items Addressed This Visit       Diabetes mellitus, type 2 (Multi) - Primary    Hyperlipidemia    Hypertension   Dm2:  Due to cr: stop metformin  Will stop glipizide and start humalog 6 units twice daily with meals  Order sadiq  Continue n at 26 qam: almost out then will move to analog basal  Continue trulicity  Htn:  Bp excellent  Lipids:  Continue statin      Electronically signed by:  Isabel Villegas MD 05/24/24 3:35 PM              "

## 2024-06-12 ENCOUNTER — LAB (OUTPATIENT)
Dept: LAB | Facility: LAB | Age: 79
End: 2024-06-12
Payer: MEDICARE

## 2024-06-12 DIAGNOSIS — N25.81 SECONDARY HYPERPARATHYROIDISM OF RENAL ORIGIN (MULTI): ICD-10-CM

## 2024-06-12 DIAGNOSIS — N18.32 CHRONIC KIDNEY DISEASE, STAGE 3B (MULTI): Primary | ICD-10-CM

## 2024-06-12 PROCEDURE — 36415 COLL VENOUS BLD VENIPUNCTURE: CPT

## 2024-06-12 PROCEDURE — 82570 ASSAY OF URINE CREATININE: CPT

## 2024-06-12 PROCEDURE — 83970 ASSAY OF PARATHORMONE: CPT

## 2024-06-12 PROCEDURE — 80069 RENAL FUNCTION PANEL: CPT

## 2024-06-12 PROCEDURE — 82043 UR ALBUMIN QUANTITATIVE: CPT

## 2024-06-12 PROCEDURE — 82306 VITAMIN D 25 HYDROXY: CPT

## 2024-06-13 LAB
25(OH)D3 SERPL-MCNC: 29 NG/ML (ref 30–100)
ALBUMIN SERPL BCP-MCNC: 4.1 G/DL (ref 3.4–5)
ANION GAP SERPL CALC-SCNC: 20 MMOL/L (ref 10–20)
BUN SERPL-MCNC: 31 MG/DL (ref 6–23)
CALCIUM SERPL-MCNC: 9.5 MG/DL (ref 8.6–10.6)
CHLORIDE SERPL-SCNC: 96 MMOL/L (ref 98–107)
CO2 SERPL-SCNC: 22 MMOL/L (ref 21–32)
CREAT SERPL-MCNC: 1.96 MG/DL (ref 0.5–1.05)
CREAT UR-MCNC: 106.2 MG/DL (ref 20–320)
EGFRCR SERPLBLD CKD-EPI 2021: 26 ML/MIN/1.73M*2
GLUCOSE SERPL-MCNC: 447 MG/DL (ref 74–99)
MICROALBUMIN UR-MCNC: 10.9 MG/L
MICROALBUMIN/CREAT UR: 10.3 UG/MG CREAT
PHOSPHATE SERPL-MCNC: 3.9 MG/DL (ref 2.5–4.9)
POTASSIUM SERPL-SCNC: 4.5 MMOL/L (ref 3.5–5.3)
PTH-INTACT SERPL-MCNC: 86.3 PG/ML (ref 18.5–88)
SODIUM SERPL-SCNC: 133 MMOL/L (ref 136–145)

## 2024-06-15 DIAGNOSIS — E78.5 HYPERLIPIDEMIA, UNSPECIFIED HYPERLIPIDEMIA TYPE: ICD-10-CM

## 2024-06-15 DIAGNOSIS — I10 PRIMARY HYPERTENSION: ICD-10-CM

## 2024-06-15 RX ORDER — PRAVASTATIN SODIUM 80 MG/1
80 TABLET ORAL DAILY
Qty: 90 TABLET | Refills: 1 | Status: SHIPPED | OUTPATIENT
Start: 2024-06-15 | End: 2024-12-12

## 2024-06-15 RX ORDER — LISINOPRIL AND HYDROCHLOROTHIAZIDE 20; 25 MG/1; MG/1
1 TABLET ORAL DAILY
Qty: 90 TABLET | Refills: 1 | Status: SHIPPED | OUTPATIENT
Start: 2024-06-15

## 2024-06-15 NOTE — TELEPHONE ENCOUNTER
Patient needs refill on lisinopril 25/25 mg and pravastatin 80 mg sent to [facility] in [place].  LOV:  3/7/2024, NOV: 3/13/2025.

## 2024-06-17 DIAGNOSIS — E11.9 TYPE 2 DIABETES MELLITUS WITHOUT COMPLICATION, UNSPECIFIED WHETHER LONG TERM INSULIN USE (MULTI): Primary | ICD-10-CM

## 2024-06-17 RX ORDER — INSULIN LISPRO 100 U/ML
6 INJECTION, SOLUTION SUBCUTANEOUS
Qty: 10.8 ML | Refills: 1 | Status: SHIPPED | OUTPATIENT
Start: 2024-06-17 | End: 2025-06-17

## 2024-07-15 ENCOUNTER — TELEPHONE (OUTPATIENT)
Dept: ENDOCRINOLOGY | Facility: CLINIC | Age: 79
End: 2024-07-15
Payer: MEDICARE

## 2024-07-15 NOTE — TELEPHONE ENCOUNTER
Patient called in states her blood sugar has been reading HI on her freestyle sadiq she checked with handheld glucometer yesterday and today .     07/15/24 l-478  07/14/24 b-380 hs-480    Pt does not have any more recent readings states all others are from beginning of July.     Pt is on Humulin N-26U   Admelop 6U with meals.     Next ov 10/04/24

## 2024-07-22 ENCOUNTER — TELEPHONE (OUTPATIENT)
Dept: ENDOCRINOLOGY | Facility: CLINIC | Age: 79
End: 2024-07-22
Payer: MEDICARE

## 2024-07-22 NOTE — TELEPHONE ENCOUNTER
Pt called in her blood sugar readings     Pt is on Humulin N 30U  Mealtime 10U per meal     07/22/24 b-95   07/21/24 am-119 pm-366  07/20/24 am-210 pm-421  07/19/24 am-111 pm-472   07/18/24 am-121 pm-550  07/17/24 am-158 pm-408  07/16/24 am-238 pm-440

## 2024-07-29 ENCOUNTER — APPOINTMENT (OUTPATIENT)
Dept: PRIMARY CARE | Facility: CLINIC | Age: 79
End: 2024-07-29
Payer: MEDICARE

## 2024-07-29 VITALS
OXYGEN SATURATION: 95 % | HEART RATE: 57 BPM | TEMPERATURE: 98.4 F | WEIGHT: 173 LBS | BODY MASS INDEX: 28.82 KG/M2 | DIASTOLIC BLOOD PRESSURE: 69 MMHG | SYSTOLIC BLOOD PRESSURE: 171 MMHG | HEIGHT: 65 IN

## 2024-07-29 DIAGNOSIS — E11.69 TYPE 2 DIABETES MELLITUS WITH OTHER SPECIFIED COMPLICATION, WITH LONG-TERM CURRENT USE OF INSULIN (MULTI): ICD-10-CM

## 2024-07-29 DIAGNOSIS — Z79.4 TYPE 2 DIABETES MELLITUS WITH OTHER SPECIFIED COMPLICATION, WITH LONG-TERM CURRENT USE OF INSULIN (MULTI): ICD-10-CM

## 2024-07-29 DIAGNOSIS — I10 PRIMARY HYPERTENSION: ICD-10-CM

## 2024-07-29 DIAGNOSIS — R26.89 BALANCE PROBLEM: ICD-10-CM

## 2024-07-29 DIAGNOSIS — G44.52 NEW DAILY PERSISTENT HEADACHE: Primary | ICD-10-CM

## 2024-07-29 PROCEDURE — 3077F SYST BP >= 140 MM HG: CPT | Performed by: INTERNAL MEDICINE

## 2024-07-29 PROCEDURE — 99214 OFFICE O/P EST MOD 30 MIN: CPT | Performed by: INTERNAL MEDICINE

## 2024-07-29 PROCEDURE — 3078F DIAST BP <80 MM HG: CPT | Performed by: INTERNAL MEDICINE

## 2024-07-29 PROCEDURE — 1159F MED LIST DOCD IN RCRD: CPT | Performed by: INTERNAL MEDICINE

## 2024-07-29 PROCEDURE — 1036F TOBACCO NON-USER: CPT | Performed by: INTERNAL MEDICINE

## 2024-07-29 RX ORDER — LISINOPRIL 20 MG/1
1 TABLET ORAL
COMMUNITY
Start: 2024-06-17

## 2024-07-29 RX ORDER — BLOOD-GLUCOSE METER
EACH MISCELLANEOUS
Qty: 300 STRIP | Refills: 3 | Status: SHIPPED | OUTPATIENT
Start: 2024-07-29

## 2024-07-29 RX ORDER — NAPROXEN SODIUM 220 MG
TABLET ORAL
Qty: 180 EACH | Refills: 3 | Status: SHIPPED | OUTPATIENT
Start: 2024-07-29

## 2024-07-29 RX ORDER — FLASH GLUCOSE SENSOR
KIT MISCELLANEOUS
Qty: 6 EACH | Refills: 3 | Status: SHIPPED | OUTPATIENT
Start: 2024-07-29

## 2024-07-29 NOTE — PROGRESS NOTES
"PCP: Nyla Khan MD   I have reviewed existing histories, notes, past medical history, surgical history, social history, family history, med list, allergy list, and immunization, and updated.    HPI:   For two weeks, frontal headache (around her eyes), and feeling off balance. Has to hold on to the wall, furniture to walk. No Numbness, tingling or weakness no Blurry vision no Bowl or bladder dysfunction. No Fever and chills. No Nausea or vomiting  She feels more out of balance if bending her neck backward. No head injury prior to onset. No falls before onset or after  Pt with uncontrolled DM, and CKD    Lab Results   Component Value Date    WBC 13.4 (H) 05/17/2024    HGB 14.5 05/17/2024    HCT 43.1 05/17/2024     05/17/2024    CHOL 211 (H) 05/17/2024    TRIG 363 (H) 05/17/2024    HDL 54.3 05/17/2024    LDLDIRECT 105 04/19/2018    ALT 14 05/17/2024    AST 12 05/17/2024     (L) 06/12/2024    K 4.5 06/12/2024    CL 96 (L) 06/12/2024    CREATININE 1.96 (H) 06/12/2024    BUN 31 (H) 06/12/2024    CO2 22 06/12/2024    TSH 1.04 09/09/2021    HGBA1C 9.1 (H) 05/17/2024     Physical exam:  /69   Pulse 57   Temp 36.9 °C (98.4 °F)   Ht 1.651 m (5' 5\")   Wt 78.5 kg (173 lb)   SpO2 95%   BMI 28.79 kg/m²    Bp on recheck still high.  Facial symmetric. CN normal   Neck exam showed no mass, lymphadenopathy, or thyroid enlargement, and no carotid bruit.  Heart RR  Lungs clear  Motor sensory symmetric, none focal.  Babinski negative  No leg edema.  Her gait is slow and guarded    Assessments/orders:   1. New daily persistent headache  TSH with reflex to Free T4 if abnormal, Vitamin B12, Serum Protein Electrophoresis, CBC, Basic metabolic panel, CT head w and wo IV contrast, Referral to Neurology      2. Type 2 diabetes mellitus with other specified complication, with long-term current use of insulin (Multi)  insulin syringe-needle U-100 (BD Insulin Syringe Ultra-Fine) 31G X 5/16\" 0.5 mL syringe, blood sugar " diagnostic (OneTouch Verio test strips) strip, flash glucose sensor kit (FreeStyle Misty 2 Sensor) kit      3. Balance problem  TSH with reflex to Free T4 if abnormal, Vitamin B12, Serum Protein Electrophoresis, CBC, Basic metabolic panel, CT head w and wo IV contrast, Referral to Neurology      4. Primary hypertension  TSH with reflex to Free T4 if abnormal        Recheck bp in office in 1-2 weeks.  Refer to see neurologist  CT head  Get additional labs

## 2024-07-30 ENCOUNTER — APPOINTMENT (OUTPATIENT)
Dept: RADIOLOGY | Facility: HOSPITAL | Age: 79
End: 2024-07-30
Payer: MEDICARE

## 2024-07-30 ENCOUNTER — TELEPHONE (OUTPATIENT)
Dept: ENDOCRINOLOGY | Facility: CLINIC | Age: 79
End: 2024-07-30
Payer: MEDICARE

## 2024-07-30 NOTE — TELEPHONE ENCOUNTER
Pt called in blood sugar readings     Pt is on   Humulin n 30u   Mealtime 14Units per meal    07/30/24 b- 237  07/29/24 am-92 pm-407  07/28/24 am-193 pm-559  07/27/24 am-118 pm-504  07/26/24 am-142 pm-388  07/25/24 am-149 pm-490  07/24/24 am-159 xp=654

## 2024-07-31 ENCOUNTER — LAB (OUTPATIENT)
Dept: LAB | Facility: LAB | Age: 79
End: 2024-07-31
Payer: MEDICARE

## 2024-07-31 DIAGNOSIS — R26.89 BALANCE PROBLEM: ICD-10-CM

## 2024-07-31 DIAGNOSIS — G44.52 NEW DAILY PERSISTENT HEADACHE: ICD-10-CM

## 2024-07-31 DIAGNOSIS — I10 PRIMARY HYPERTENSION: ICD-10-CM

## 2024-07-31 LAB
ANION GAP SERPL CALC-SCNC: 15 MMOL/L (ref 10–20)
BUN SERPL-MCNC: 23 MG/DL (ref 6–23)
CALCIUM SERPL-MCNC: 9.4 MG/DL (ref 8.6–10.6)
CHLORIDE SERPL-SCNC: 100 MMOL/L (ref 98–107)
CO2 SERPL-SCNC: 27 MMOL/L (ref 21–32)
CREAT SERPL-MCNC: 1.59 MG/DL (ref 0.5–1.05)
EGFRCR SERPLBLD CKD-EPI 2021: 33 ML/MIN/1.73M*2
GLUCOSE SERPL-MCNC: 296 MG/DL (ref 74–99)
POTASSIUM SERPL-SCNC: 4.3 MMOL/L (ref 3.5–5.3)
PROT SERPL-MCNC: 6.3 G/DL (ref 6.4–8.2)
SODIUM SERPL-SCNC: 138 MMOL/L (ref 136–145)
TSH SERPL-ACNC: 1.19 MIU/L (ref 0.44–3.98)
VIT B12 SERPL-MCNC: 446 PG/ML (ref 211–911)

## 2024-07-31 PROCEDURE — 80048 BASIC METABOLIC PNL TOTAL CA: CPT

## 2024-07-31 PROCEDURE — 36415 COLL VENOUS BLD VENIPUNCTURE: CPT

## 2024-07-31 PROCEDURE — 84443 ASSAY THYROID STIM HORMONE: CPT

## 2024-07-31 PROCEDURE — 82607 VITAMIN B-12: CPT

## 2024-07-31 PROCEDURE — 84155 ASSAY OF PROTEIN SERUM: CPT

## 2024-07-31 PROCEDURE — 84165 PROTEIN E-PHORESIS SERUM: CPT

## 2024-07-31 PROCEDURE — 85027 COMPLETE CBC AUTOMATED: CPT

## 2024-08-01 LAB
ALBUMIN: 3.5 G/DL (ref 3.4–5)
ALPHA 1 GLOBULIN: 0.3 G/DL (ref 0.2–0.6)
ALPHA 2 GLOBULIN: 0.7 G/DL (ref 0.4–1.1)
BETA GLOBULIN: 0.9 G/DL (ref 0.5–1.2)
ERYTHROCYTE [DISTWIDTH] IN BLOOD BY AUTOMATED COUNT: 12.2 % (ref 11.5–14.5)
GAMMA GLOBULIN: 0.9 G/DL (ref 0.5–1.4)
HCT VFR BLD AUTO: 42 % (ref 36–46)
HGB BLD-MCNC: 13.2 G/DL (ref 12–16)
MCH RBC QN AUTO: 28.5 PG (ref 26–34)
MCHC RBC AUTO-ENTMCNC: 31.4 G/DL (ref 32–36)
MCV RBC AUTO: 91 FL (ref 80–100)
NRBC BLD-RTO: 0 /100 WBCS (ref 0–0)
PATH REVIEW-SERUM PROTEIN ELECTROPHORESIS: NORMAL
PLATELET # BLD AUTO: 309 X10*3/UL (ref 150–450)
PROTEIN ELECTROPHORESIS COMMENT: NORMAL
RBC # BLD AUTO: 4.63 X10*6/UL (ref 4–5.2)
WBC # BLD AUTO: 8.8 X10*3/UL (ref 4.4–11.3)

## 2024-08-02 ENCOUNTER — TELEPHONE (OUTPATIENT)
Dept: ENDOCRINOLOGY | Facility: CLINIC | Age: 79
End: 2024-08-02
Payer: MEDICARE

## 2024-08-02 NOTE — TELEPHONE ENCOUNTER
Pt called in blood sugar readings     Pt is on mealtime insulin 18units per meal  Humulin n 30units    08/02/24 b-220 l-370 (after eating)  08/01/24 b-111 d-330 hs-389  07/31/24 b-137 d-267 hs-354  07/30/24 b-237 hs-353  
The patient is a 74y Male complaining of fall down stairs.

## 2024-08-05 ENCOUNTER — APPOINTMENT (OUTPATIENT)
Dept: PRIMARY CARE | Facility: CLINIC | Age: 79
End: 2024-08-05
Payer: MEDICARE

## 2024-08-05 VITALS
BODY MASS INDEX: 28.69 KG/M2 | OXYGEN SATURATION: 95 % | WEIGHT: 172.4 LBS | SYSTOLIC BLOOD PRESSURE: 152 MMHG | HEART RATE: 88 BPM | DIASTOLIC BLOOD PRESSURE: 72 MMHG | TEMPERATURE: 98.6 F

## 2024-08-05 DIAGNOSIS — I12.9 HYPERTENSIVE KIDNEY DISEASE WITH STAGE 3B CHRONIC KIDNEY DISEASE (MULTI): Primary | ICD-10-CM

## 2024-08-05 DIAGNOSIS — N18.32 HYPERTENSIVE KIDNEY DISEASE WITH STAGE 3B CHRONIC KIDNEY DISEASE (MULTI): Primary | ICD-10-CM

## 2024-08-05 PROCEDURE — 3077F SYST BP >= 140 MM HG: CPT | Performed by: INTERNAL MEDICINE

## 2024-08-05 PROCEDURE — 1036F TOBACCO NON-USER: CPT | Performed by: INTERNAL MEDICINE

## 2024-08-05 PROCEDURE — 3078F DIAST BP <80 MM HG: CPT | Performed by: INTERNAL MEDICINE

## 2024-08-05 PROCEDURE — 1159F MED LIST DOCD IN RCRD: CPT | Performed by: INTERNAL MEDICINE

## 2024-08-05 PROCEDURE — 99214 OFFICE O/P EST MOD 30 MIN: CPT | Performed by: INTERNAL MEDICINE

## 2024-08-05 RX ORDER — AMLODIPINE BESYLATE 5 MG/1
5 TABLET ORAL DAILY
Qty: 30 TABLET | Refills: 5 | Status: SHIPPED | OUTPATIENT
Start: 2024-08-05 | End: 2025-02-01

## 2024-08-05 NOTE — PROGRESS NOTES
PCP: Nyla Khan MD   I have reviewed existing histories, notes, past medical history, surgical history, social history, family history, med list, allergy list, and immunization, and updated.    HPI:   Follow up visit for htn from last visit.  Her bp still is high, between 140-160 systolic.  No new symptoms  She is to have ct scan head 08/06/24    Lab Results   Component Value Date    WBC 8.8 07/31/2024    HGB 13.2 07/31/2024    HCT 42.0 07/31/2024     07/31/2024    CHOL 211 (H) 05/17/2024    TRIG 363 (H) 05/17/2024    HDL 54.3 05/17/2024    LDLDIRECT 105 04/19/2018    ALT 14 05/17/2024    AST 12 05/17/2024     07/31/2024    K 4.3 07/31/2024     07/31/2024    CREATININE 1.59 (H) 07/31/2024    BUN 23 07/31/2024    CO2 27 07/31/2024    TSH 1.19 07/31/2024    HGBA1C 9.1 (H) 05/17/2024     Physical exam:  /72   Pulse 88   Temp 37 °C (98.6 °F)   Wt 78.2 kg (172 lb 6.4 oz)   SpO2 95%   BMI 28.69 kg/m²    No acute distress  Her gait is grossly stable today  Heart RR  Lungs clear  No leg edema.    Assessments/orders:   1. Hypertensive kidney disease with stage 3b chronic kidney disease (Multi)  amLODIPine (Norvasc) 5 mg tablet        Add amlodipine 5mg per day  Call us with your bp readings in one to 2 weeks  Low salt diet

## 2024-08-06 ENCOUNTER — HOSPITAL ENCOUNTER (OUTPATIENT)
Dept: RADIOLOGY | Facility: CLINIC | Age: 79
Discharge: HOME | End: 2024-08-06
Payer: MEDICARE

## 2024-08-06 ENCOUNTER — APPOINTMENT (OUTPATIENT)
Dept: RADIOLOGY | Facility: HOSPITAL | Age: 79
End: 2024-08-06
Payer: MEDICARE

## 2024-08-06 ENCOUNTER — TELEPHONE (OUTPATIENT)
Dept: PRIMARY CARE | Facility: CLINIC | Age: 79
End: 2024-08-06

## 2024-08-06 DIAGNOSIS — R26.89 BALANCE PROBLEM: ICD-10-CM

## 2024-08-06 DIAGNOSIS — G44.52 NEW DAILY PERSISTENT HEADACHE: ICD-10-CM

## 2024-08-06 PROCEDURE — 2550000001 HC RX 255 CONTRASTS: Performed by: INTERNAL MEDICINE

## 2024-08-06 PROCEDURE — 70470 CT HEAD/BRAIN W/O & W/DYE: CPT | Performed by: RADIOLOGY

## 2024-08-06 PROCEDURE — 70470 CT HEAD/BRAIN W/O & W/DYE: CPT

## 2024-08-06 NOTE — TELEPHONE ENCOUNTER
Dr. Kirkland,  I saw this nice patient recently for abnormal gait, which is sudden onset, and I did a ct scan which showed possible normal pressure hydrocephalus. She could not get in to see any neurologist until November. Any chance you or your colleague could see her soon due to the CT finding?    I really appreciate your consideration.  Thanks    Dr. Khan

## 2024-08-12 ENCOUNTER — TELEPHONE (OUTPATIENT)
Dept: ENDOCRINOLOGY | Facility: CLINIC | Age: 79
End: 2024-08-12
Payer: MEDICARE

## 2024-08-12 DIAGNOSIS — E11.9 TYPE 2 DIABETES MELLITUS WITHOUT COMPLICATION, UNSPECIFIED WHETHER LONG TERM INSULIN USE (MULTI): ICD-10-CM

## 2024-08-12 RX ORDER — INSULIN LISPRO 100 U/ML
22 INJECTION, SOLUTION SUBCUTANEOUS
Qty: 39.6 ML | Refills: 3 | Status: SHIPPED | OUTPATIENT
Start: 2024-08-12 | End: 2025-08-12

## 2024-08-12 NOTE — TELEPHONE ENCOUNTER
Patient called in blood sugar readings    Pt is taking mealtime insulin 18U   Humulin n 30U    Pt states she did not get voicemail that was left on 08/02/24 prior to being out of office so she never increased to 22 units.     08/12/24 am-233  08/11/24 am-92 pm-345  08/10/24 am-143 pm-382  08/09/24 240am-113 9am-256 pm-385  08/08/24 am-231 pm-317  08/07/24 am-218 pm-HI  08/06/24 am-328 pm-374

## 2024-08-14 ENCOUNTER — APPOINTMENT (OUTPATIENT)
Dept: NEUROLOGY | Facility: CLINIC | Age: 79
End: 2024-08-14
Payer: MEDICARE

## 2024-08-14 VITALS
DIASTOLIC BLOOD PRESSURE: 69 MMHG | HEIGHT: 66 IN | TEMPERATURE: 97 F | RESPIRATION RATE: 18 BRPM | HEART RATE: 91 BPM | BODY MASS INDEX: 27.77 KG/M2 | WEIGHT: 172.8 LBS | SYSTOLIC BLOOD PRESSURE: 161 MMHG

## 2024-08-14 DIAGNOSIS — I67.2 CEREBRAL ATHEROSCLEROSIS: ICD-10-CM

## 2024-08-14 DIAGNOSIS — G91.2 NPH (NORMAL PRESSURE HYDROCEPHALUS) (MULTI): Primary | ICD-10-CM

## 2024-08-14 DIAGNOSIS — R26.89 BALANCE PROBLEM: ICD-10-CM

## 2024-08-14 DIAGNOSIS — G44.52 NEW DAILY PERSISTENT HEADACHE: ICD-10-CM

## 2024-08-14 DIAGNOSIS — I63.433 CEREBRAL INFARCTION DUE TO EMBOLISM OF BILATERAL POSTERIOR CEREBRAL ARTERIES (MULTI): ICD-10-CM

## 2024-08-14 PROCEDURE — 99205 OFFICE O/P NEW HI 60 MIN: CPT | Performed by: PSYCHIATRY & NEUROLOGY

## 2024-08-14 PROCEDURE — 3077F SYST BP >= 140 MM HG: CPT | Performed by: PSYCHIATRY & NEUROLOGY

## 2024-08-14 PROCEDURE — 3078F DIAST BP <80 MM HG: CPT | Performed by: PSYCHIATRY & NEUROLOGY

## 2024-08-14 PROCEDURE — 1036F TOBACCO NON-USER: CPT | Performed by: PSYCHIATRY & NEUROLOGY

## 2024-08-14 PROCEDURE — 1159F MED LIST DOCD IN RCRD: CPT | Performed by: PSYCHIATRY & NEUROLOGY

## 2024-08-14 PROCEDURE — 1160F RVW MEDS BY RX/DR IN RCRD: CPT | Performed by: PSYCHIATRY & NEUROLOGY

## 2024-08-14 NOTE — PROGRESS NOTES
Consultation:   Subjective      Verenice Ruiz is a 78 y.o. year old female is here for consult for gait difficulties and ? NPH.     Referred by Nyla Khan MD      HPI    She has been having frontal headaches around the eyes, tension and achy.  Feels like a stretch of a rubber band. Not really in the head but mostly around the eyes.  No worsening vision changes.  Balance became an issue 1 month ago.  Standing up she feels her body sway.   No memory issues.  No passing out.   FH: father had PD in his 80s.  Paternal aunt had PD aswell.   She has DM.  She has hypertensive kidney disease.  She sees her eye doctor yearly.  No falls.  No devices to walk.   Ct head was personally reviewed, dated from 8/6/24. Shows moderate volume loss, generalized but ? Sparing of occipital lobes.  Moderate ventriculomegaly.     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    Environmental and seasonal allergies    Nuclear senile cataract of both eyes    Shoulder pain     Past Medical History:   Diagnosis Date    Encounter for screening for malignant neoplasm of colon 11/16/2020    Screening for colon cancer    Encounter for screening for malignant neoplasm of skin 12/03/2018    Skin cancer screening    Encounter for screening mammogram for malignant neoplasm of breast 11/16/2020    Screening mammogram, encounter for    Personal history of other specified conditions 08/17/2021    History of fatigue    Sciatica, right side 08/17/2021    Sciatica of right side    Type 2 diabetes mellitus with diabetic chronic kidney disease (Multi) 08/24/2021    CKD stage 3 secondary to diabetes     Past Surgical History:   Procedure Laterality Date    HYSTERECTOMY   "01/31/2017    Hysterectomy     Social History     Tobacco Use    Smoking status: Never    Smokeless tobacco: Never   Substance Use Topics    Alcohol use: Never     family history includes Arthritis in her mother; Diabetes in her father and mother.    Current Outpatient Medications:     Admelog SoloStar U-100 Insulin 100 unit/mL injection, Inject 22 Units under the skin 2 times daily (morning and late afternoon). Take as directed per insulin instructions., Disp: 39.6 mL, Rfl: 3    amLODIPine (Norvasc) 5 mg tablet, Take 1 tablet (5 mg) by mouth once daily., Disp: 30 tablet, Rfl: 5    blood sugar diagnostic (OneTouch Verio test strips) strip, Use bidUSE TO TEST THREE TIMES DAILY, Disp: 300 strip, Rfl: 3    flash glucose scanning reader (FreeStyle Misty 2 [place]) misc, Use as instructed, Disp: 1 each, Rfl: 0    flash glucose sensor kit (FreeStyle Misty 2 Sensor) kit, Use as instructed,change every 2 wks, Disp: 6 each, Rfl: 3    insulin lispro (HumaLOG) 100 unit/mL injection, Inject 6 Units under the skin 2 times a day. Take as directed per insulin instructions., Disp: 10.8 mL, Rfl: 1    insulin NPH, Isophane, (HumuLIN N NPH U-100 Insulin) 100 unit/mL injection, INJECT 30 UNITS IN THE MORNING AND 14 UNITS IN THE EVENING, Disp: 40 mL, Rfl: 3    insulin syringe-needle U-100 (BD Insulin Syringe Ultra-Fine) 31G X 5/16\" 0.5 mL syringe, Use as instructedUSE WITH INSULIN TWICE A DAY, Disp: 180 each, Rfl: 3    lisinopriL-hydrochlorothiazide 20-25 mg tablet, Take 1 tablet by mouth once daily., Disp: 90 tablet, Rfl: 1    pen needle, diabetic (BD Milka 2nd Gen Pen Needle) 32 gauge x 5/32\" needle, Twice a day, Disp: 200 each, Rfl: 1    pravastatin (Pravachol) 80 mg tablet, Take 1 tablet (80 mg) by mouth once daily., Disp: 90 tablet, Rfl: 1    Trulicity 3 mg/0.5 mL pen injector, Use as directed., Disp: , Rfl:   Allergies   Allergen Reactions    Allerg Xt,D.Farinae-D.Pteronys Other    Cat Hair Standardized Allergenic Extract Other    " Rosuvastatin Other     Severe myalgia    Simvastatin Other     Severe shoulder upper arm pain bilateral     There were no vitals taken for this visit.  Neurological Exam/Physical Exam:    Constitutional: General appearance: no acute distress. Pleasant.   Auscultation of Heart: Regular rate and rhythm, no murmurs, normal S1 and S2.   Carotid Arteries: no bruits  Peripheral Vascular Exam: No swelling, edema or tenderness to palpation in extremities  Mental status: no distress, alert, interactive and cooperative. Affect is appropriate.   Orientation: oriented to person, oriented to place and oriented to time.   Attention: normal attention /concentration.   Language: normal comprehension and naming.   Eyes: The ophthalmoscopic examination was normal.   Cranial nerve II: Visual fields full to confrontation.   Cranial nerves III, IV, and VI: Pupils round, equally reactive to light; EOMs intact. No nystagmus.   Cranial Nerve V: Facial sensation intact to LT bilaterally.   Cranial nerve VII: no facial droop  Cranial nerve VIII: Hearing is intact  Cranial nerves IX and X: Palate elevates symmetrically.   Cranial nerve XI: Shoulder shrug intact.   Cranial nerve XII: Tongue is midline.  Motor:  Muscle bulk was normal in both upper and lower extremities.    No atrophy.   Strength is normal.   Deep Tendon Reflexes: left biceps 2+ , right biceps 2+, left triceps 2+, right triceps 2+, left brachioradialis 2+, right brachioradialis 2+, left patella 2+, right patella 2+, left ankle jerk 2+, right ankle jerk 2+   Plantar Reflex: Toes downgoing to plantar stimulation on the left. Toes downgoing to plantar stimulation on the right.   Sensory Exam: Normal to vibratory sensation  Coordination:  no limb dystaxia and rapid alternating movements are intact.   Gait:  cautious.  Unable to tandem.        Labs:  CBC:   Lab Results   Component Value Date    WBC 8.8 07/31/2024    HGB 13.2 07/31/2024    HCT 42.0 07/31/2024     07/31/2024      BMP:   Lab Results   Component Value Date     07/31/2024    K 4.3 07/31/2024     07/31/2024    CO2 27 07/31/2024    BUN 23 07/31/2024    CREATININE 1.59 (H) 07/31/2024    CALCIUM 9.4 07/31/2024    PHOS 3.9 06/12/2024     LFT:   Lab Results   Component Value Date    ALKPHOS 78 05/17/2024    BILITOT 0.4 05/17/2024    PROT 6.3 (L) 07/31/2024    ALBUMIN 4.1 06/12/2024    ALT 14 05/17/2024    AST 12 05/17/2024         Assessment/Plan   Problem List Items Addressed This Visit    None  Visit Diagnoses         Codes    NPH (normal pressure hydrocephalus) (Multi)    -  Primary G91.2        She may have NPH on CT images but clinically not meeting all the criteria.   Needs MRI brain to rule out stroke, concern with her several vascular risk factors. Concern for posterior territory area structural lesion with her ataxia/ imbalance.

## 2024-08-14 NOTE — PATIENT INSTRUCTIONS
"It was a pleasure seeing you today.     I want you to get a MRI Brain- Please call radiology to schedule at [phone].   If the results are abnormal, I will call you to discuss.     Please make a follow up appointment in 4-5 months.     For any urgent issues or needing to speak to a medical assistant please call [phone], option 6 during our office hours Monday-Friday 8am-4pm, and leave a voicemail with your concern.  My office will try to reach back you as soon as possible within 24 (business) hours.  If you have an emergency please call 911 or visit a local urgent care or nearest emergency room.      Please understand that [employer]erman is a useful communication tool for simple \"normal\" results or a refill request but I would not recommend using this tool for emergent or urgent issues or for conversations with me.  I am happy to ask my staff to rearrange a follow up visit or a virtual visit sooner than requested if appropriate for your care.    "

## 2024-08-20 ENCOUNTER — TELEPHONE (OUTPATIENT)
Dept: PRIMARY CARE | Facility: CLINIC | Age: 79
End: 2024-08-20
Payer: MEDICARE

## 2024-08-20 DIAGNOSIS — I12.9 HYPERTENSIVE KIDNEY DISEASE WITH STAGE 3B CHRONIC KIDNEY DISEASE (MULTI): ICD-10-CM

## 2024-08-20 DIAGNOSIS — N18.32 HYPERTENSIVE KIDNEY DISEASE WITH STAGE 3B CHRONIC KIDNEY DISEASE (MULTI): ICD-10-CM

## 2024-08-20 RX ORDER — AMLODIPINE BESYLATE 10 MG/1
10 TABLET ORAL DAILY
Qty: 30 TABLET | Refills: 3 | Status: SHIPPED | OUTPATIENT
Start: 2024-08-20 | End: 2024-12-18

## 2024-08-20 NOTE — TELEPHONE ENCOUNTER
Bp still high, let us increase amlodipine to 10mg per day, currently at 5mg per day.  She can take two of the 5mg.  New rx of 10mg sent   Please call us back with bp readings in 2 weeks. Please report any Side effects with higher dose (most common Side effects is ankle swelling)

## 2024-08-27 ENCOUNTER — HOSPITAL ENCOUNTER (OUTPATIENT)
Dept: RADIOLOGY | Facility: CLINIC | Age: 79
Discharge: HOME | End: 2024-08-27
Payer: MEDICARE

## 2024-08-27 DIAGNOSIS — R26.89 BALANCE PROBLEM: ICD-10-CM

## 2024-08-27 DIAGNOSIS — G91.2 NPH (NORMAL PRESSURE HYDROCEPHALUS) (MULTI): ICD-10-CM

## 2024-08-27 PROCEDURE — 70551 MRI BRAIN STEM W/O DYE: CPT | Performed by: RADIOLOGY

## 2024-08-27 PROCEDURE — 70551 MRI BRAIN STEM W/O DYE: CPT

## 2024-09-05 ENCOUNTER — TELEPHONE (OUTPATIENT)
Dept: NEUROLOGY | Facility: CLINIC | Age: 79
End: 2024-09-05
Payer: MEDICARE

## 2024-09-27 ENCOUNTER — LAB (OUTPATIENT)
Dept: LAB | Facility: LAB | Age: 79
End: 2024-09-27
Payer: MEDICARE

## 2024-09-27 DIAGNOSIS — E11.9 TYPE 2 DIABETES MELLITUS WITHOUT COMPLICATION, UNSPECIFIED WHETHER LONG TERM INSULIN USE (MULTI): ICD-10-CM

## 2024-09-27 PROCEDURE — 83036 HEMOGLOBIN GLYCOSYLATED A1C: CPT

## 2024-09-27 PROCEDURE — 80053 COMPREHEN METABOLIC PANEL: CPT

## 2024-09-27 PROCEDURE — 36415 COLL VENOUS BLD VENIPUNCTURE: CPT

## 2024-09-28 LAB
ALBUMIN SERPL BCP-MCNC: 3.9 G/DL (ref 3.4–5)
ALP SERPL-CCNC: 79 U/L (ref 33–136)
ALT SERPL W P-5'-P-CCNC: 12 U/L (ref 7–45)
ANION GAP SERPL CALC-SCNC: 14 MMOL/L (ref 10–20)
AST SERPL W P-5'-P-CCNC: 13 U/L (ref 9–39)
BILIRUB SERPL-MCNC: 0.5 MG/DL (ref 0–1.2)
BUN SERPL-MCNC: 30 MG/DL (ref 6–23)
CALCIUM SERPL-MCNC: 9.4 MG/DL (ref 8.6–10.6)
CHLORIDE SERPL-SCNC: 100 MMOL/L (ref 98–107)
CO2 SERPL-SCNC: 27 MMOL/L (ref 21–32)
CREAT SERPL-MCNC: 1.82 MG/DL (ref 0.5–1.05)
EGFRCR SERPLBLD CKD-EPI 2021: 28 ML/MIN/1.73M*2
EST. AVERAGE GLUCOSE BLD GHB EST-MCNC: 275 MG/DL
GLUCOSE SERPL-MCNC: 358 MG/DL (ref 74–99)
HBA1C MFR BLD: 11.2 %
POTASSIUM SERPL-SCNC: 4.6 MMOL/L (ref 3.5–5.3)
PROT SERPL-MCNC: 6.7 G/DL (ref 6.4–8.2)
SODIUM SERPL-SCNC: 136 MMOL/L (ref 136–145)

## 2024-10-04 ENCOUNTER — APPOINTMENT (OUTPATIENT)
Dept: ENDOCRINOLOGY | Facility: CLINIC | Age: 79
End: 2024-10-04
Payer: MEDICARE

## 2024-10-04 ENCOUNTER — TELEPHONE (OUTPATIENT)
Dept: PRIMARY CARE | Facility: CLINIC | Age: 79
End: 2024-10-04

## 2024-10-04 VITALS
WEIGHT: 179.4 LBS | RESPIRATION RATE: 16 BRPM | SYSTOLIC BLOOD PRESSURE: 122 MMHG | DIASTOLIC BLOOD PRESSURE: 72 MMHG | HEIGHT: 66 IN | HEART RATE: 80 BPM | BODY MASS INDEX: 28.83 KG/M2

## 2024-10-04 DIAGNOSIS — E11.9 TYPE 2 DIABETES MELLITUS WITHOUT COMPLICATION, UNSPECIFIED WHETHER LONG TERM INSULIN USE (MULTI): Primary | ICD-10-CM

## 2024-10-04 DIAGNOSIS — E78.5 HYPERLIPIDEMIA, UNSPECIFIED HYPERLIPIDEMIA TYPE: ICD-10-CM

## 2024-10-04 DIAGNOSIS — I10 HYPERTENSION, UNSPECIFIED TYPE: ICD-10-CM

## 2024-10-04 PROCEDURE — 99214 OFFICE O/P EST MOD 30 MIN: CPT | Performed by: INTERNAL MEDICINE

## 2024-10-04 PROCEDURE — 3074F SYST BP LT 130 MM HG: CPT | Performed by: INTERNAL MEDICINE

## 2024-10-04 PROCEDURE — 1036F TOBACCO NON-USER: CPT | Performed by: INTERNAL MEDICINE

## 2024-10-04 PROCEDURE — 3078F DIAST BP <80 MM HG: CPT | Performed by: INTERNAL MEDICINE

## 2024-10-04 PROCEDURE — 1159F MED LIST DOCD IN RCRD: CPT | Performed by: INTERNAL MEDICINE

## 2024-10-04 ASSESSMENT — ENCOUNTER SYMPTOMS
COUGH: 0
DIARRHEA: 0
NAUSEA: 0
FATIGUE: 0
CHILLS: 0
PALPITATIONS: 0
SHORTNESS OF BREATH: 0
HEADACHES: 0
FEVER: 0
VOMITING: 0

## 2024-10-04 NOTE — PROGRESS NOTES
Endocrinology: Follow up visit  Subjective   Patient ID: Verenice Ruiz is a 79 y.o. female who presents for Diabetes (Type 2 ), Hyperlipidemia, and Hypertension.    PCP: Nyla Khan MD    HPI  Dm2:  Last time stopped metformin due to cr  Started meal insulin and continued nph 30 in pm  Adjusted meal to 22 with meals but then stopped calling them in.  Also stopped trulicity on her own??  No particular reason, thought she was to stop  Misty device    Checks sugars 4x a day  Injects insulin 3-4x a day  Currently utilizing cgm to check sugars      Review of Systems   Constitutional:  Negative for chills, fatigue and fever.   Respiratory:  Negative for cough and shortness of breath.    Cardiovascular:  Negative for chest pain and palpitations.   Gastrointestinal:  Negative for diarrhea, nausea and vomiting.   Neurological:  Negative for headaches.       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    amLODIPine (NORVASC) 10 mg, oral, Daily    blood sugar diagnostic (OneTouch Verio test strips) strip Use bidUSE TO TEST THREE TIMES DAILY    flash glucose scanning reader (FreeStyle Misty 2 [place]) misc Use as instructed    flash glucose sensor kit (FreeStyle Misty 2 Sensor) kit Use as instructed,change every 2 wks    insulin lispro (HUMALOG) 6 Units, subcutaneous, 2 times daily, Take as directed per insulin instructions.    insulin NPH, Isophane,  "(HumuLIN N NPH U-100 Insulin) 100 unit/mL injection INJECT 30 UNITS IN THE MORNING AND 14 UNITS IN THE EVENING    insulin syringe-needle U-100 (BD Insulin Syringe Ultra-Fine) 31G X 5/16\" 0.5 mL syringe Use as instructedUSE WITH INSULIN TWICE A DAY    lisinopriL-hydrochlorothiazide 20-25 mg tablet 1 tablet, oral, Daily    pen needle, diabetic (BD Milka 2nd Gen Pen Needle) 32 gauge x 5/32\" needle Twice a day    pravastatin (PRAVACHOL) 80 mg, oral, Daily    Trulicity 3 mg/0.5 mL pen injector Use as directed.        Allergies   Allergen Reactions    Allerg Xt,D.Farinae-D.Pteronys Other    Cat Hair Standardized Allergenic Extract Other    Rosuvastatin Other     Severe myalgia    Simvastatin Other     Severe shoulder upper arm pain bilateral        Objective   Vitals:    10/04/24 1156   BP: 122/72   Pulse: 80   Resp: 16      Vitals:    10/04/24 1156   Weight: 81.4 kg (179 lb 6.4 oz)      Body mass index is 28.96 kg/m².   Physical Exam  Constitutional:       Appearance: Normal appearance. She is overweight.   HENT:      Head: Normocephalic and atraumatic.   Neck:      Thyroid: No thyroid mass, thyromegaly or thyroid tenderness.   Cardiovascular:      Rate and Rhythm: Normal rate and regular rhythm.      Heart sounds: No murmur heard.     No gallop.   Pulmonary:      Effort: Pulmonary effort is normal.      Breath sounds: Normal breath sounds.   Abdominal:      Palpations: Abdomen is soft.      Comments: benign   Neurological:      General: No focal deficit present.      Mental Status: She is alert and oriented to person, place, and time.      Deep Tendon Reflexes: Reflexes are normal and symmetric.   Psychiatric:         Behavior: Behavior is cooperative.         Labs:  Lab Results   Component Value Date    HGBA1C 11.2 (H) 09/27/2024    LDLDIRECT 105 04/19/2018    TSH 1.19 07/31/2024      No results found for: \"PR1\", \"THYROIDPAB\", \"TSI\"     Assessment/Plan   Assessment & Plan  Type 2 diabetes mellitus without complication, " unspecified whether long term insulin use (Multi)  A1c is up considerably: due to stopping glp1  Will restart trulicity and increase dose monthly  Call with sugars in 3-4 wks  Orders:    Comprehensive Metabolic Panel; Future    Hemoglobin A1C; Future    dulaglutide (Trulicity) 0.75 mg/0.5 mL pen injector; Inject 0.75 mg under the skin 1 (one) time per week.    Hyperlipidemia, unspecified hyperlipidemia type    Stable on statin  Hypertension, unspecified type    Bp excellent      Electronically signed by:  Isabel Villegas MD 10/04/24 12:19 PM

## 2024-10-04 NOTE — PATIENT INSTRUCTIONS
Continue current insulin doses  Call in sugars in a month   Restart trulicity asap  Call or message with concerns

## 2024-10-04 NOTE — ASSESSMENT & PLAN NOTE
A1c is up considerably: due to stopping glp1  Will restart trulicity and increase dose monthly  Call with sugars in 3-4 wks  Orders:    Comprehensive Metabolic Panel; Future    Hemoglobin A1C; Future    dulaglutide (Trulicity) 0.75 mg/0.5 mL pen injector; Inject 0.75 mg under the skin 1 (one) time per week.

## 2024-11-04 DIAGNOSIS — E11.9 TYPE 2 DIABETES MELLITUS WITHOUT COMPLICATION, UNSPECIFIED WHETHER LONG TERM INSULIN USE (MULTI): Primary | ICD-10-CM

## 2024-11-05 ENCOUNTER — APPOINTMENT (OUTPATIENT)
Dept: NEUROLOGY | Facility: CLINIC | Age: 79
End: 2024-11-05
Payer: MEDICARE

## 2024-11-29 ENCOUNTER — LAB (OUTPATIENT)
Dept: LAB | Facility: LAB | Age: 79
End: 2024-11-29
Payer: MEDICARE

## 2024-11-29 DIAGNOSIS — E11.9 TYPE 2 DIABETES MELLITUS WITHOUT COMPLICATION, UNSPECIFIED WHETHER LONG TERM INSULIN USE (MULTI): ICD-10-CM

## 2024-11-29 DIAGNOSIS — N18.32 CHRONIC KIDNEY DISEASE, STAGE 3B (MULTI): Primary | ICD-10-CM

## 2024-11-29 PROCEDURE — 84100 ASSAY OF PHOSPHORUS: CPT

## 2024-11-29 PROCEDURE — 36415 COLL VENOUS BLD VENIPUNCTURE: CPT

## 2024-11-29 PROCEDURE — 80053 COMPREHEN METABOLIC PANEL: CPT

## 2024-11-29 PROCEDURE — 83970 ASSAY OF PARATHORMONE: CPT

## 2024-11-29 PROCEDURE — 82306 VITAMIN D 25 HYDROXY: CPT

## 2024-11-29 PROCEDURE — 82570 ASSAY OF URINE CREATININE: CPT

## 2024-11-29 PROCEDURE — 83036 HEMOGLOBIN GLYCOSYLATED A1C: CPT

## 2024-11-29 PROCEDURE — 82043 UR ALBUMIN QUANTITATIVE: CPT

## 2024-11-30 LAB
25(OH)D3 SERPL-MCNC: 29 NG/ML (ref 30–100)
ALBUMIN SERPL BCP-MCNC: 4.5 G/DL (ref 3.4–5)
ALP SERPL-CCNC: 104 U/L (ref 33–136)
ALT SERPL W P-5'-P-CCNC: 15 U/L (ref 7–45)
ANION GAP SERPL CALC-SCNC: 17 MMOL/L (ref 10–20)
AST SERPL W P-5'-P-CCNC: 16 U/L (ref 9–39)
BILIRUB SERPL-MCNC: 0.6 MG/DL (ref 0–1.2)
BUN SERPL-MCNC: 24 MG/DL (ref 6–23)
CALCIUM SERPL-MCNC: 9.8 MG/DL (ref 8.6–10.6)
CHLORIDE SERPL-SCNC: 99 MMOL/L (ref 98–107)
CO2 SERPL-SCNC: 26 MMOL/L (ref 21–32)
CREAT SERPL-MCNC: 1.63 MG/DL (ref 0.5–1.05)
CREAT UR-MCNC: 142.5 MG/DL (ref 20–320)
EGFRCR SERPLBLD CKD-EPI 2021: 32 ML/MIN/1.73M*2
EST. AVERAGE GLUCOSE BLD GHB EST-MCNC: 217 MG/DL
GLUCOSE SERPL-MCNC: 109 MG/DL (ref 74–99)
HBA1C MFR BLD: 9.2 %
MICROALBUMIN UR-MCNC: 135.3 MG/L
MICROALBUMIN/CREAT UR: 94.9 UG/MG CREAT
PHOSPHATE SERPL-MCNC: 3.3 MG/DL (ref 2.5–4.9)
POTASSIUM SERPL-SCNC: 4.1 MMOL/L (ref 3.5–5.3)
PROT SERPL-MCNC: 7.3 G/DL (ref 6.4–8.2)
PTH-INTACT SERPL-MCNC: 106.1 PG/ML (ref 18.5–88)
SODIUM SERPL-SCNC: 138 MMOL/L (ref 136–145)

## 2024-12-02 ENCOUNTER — TELEPHONE (OUTPATIENT)
Dept: PRIMARY CARE | Facility: CLINIC | Age: 79
End: 2024-12-02
Payer: MEDICARE

## 2024-12-14 DIAGNOSIS — I12.9 HYPERTENSIVE KIDNEY DISEASE WITH STAGE 3B CHRONIC KIDNEY DISEASE (MULTI): ICD-10-CM

## 2024-12-14 DIAGNOSIS — N18.32 HYPERTENSIVE KIDNEY DISEASE WITH STAGE 3B CHRONIC KIDNEY DISEASE (MULTI): ICD-10-CM

## 2024-12-15 DIAGNOSIS — E78.5 HYPERLIPIDEMIA, UNSPECIFIED HYPERLIPIDEMIA TYPE: ICD-10-CM

## 2024-12-16 RX ORDER — AMLODIPINE BESYLATE 10 MG/1
10 TABLET ORAL DAILY
Qty: 30 TABLET | Refills: 3 | Status: SHIPPED | OUTPATIENT
Start: 2024-12-16

## 2024-12-16 RX ORDER — PRAVASTATIN SODIUM 80 MG/1
80 TABLET ORAL DAILY
Qty: 90 TABLET | Refills: 1 | Status: SHIPPED | OUTPATIENT
Start: 2024-12-16

## 2025-01-10 ENCOUNTER — TELEPHONE (OUTPATIENT)
Facility: CLINIC | Age: 80
End: 2025-01-10
Payer: MEDICARE

## 2025-01-10 DIAGNOSIS — E11.9 TYPE 2 DIABETES MELLITUS WITHOUT COMPLICATION, UNSPECIFIED WHETHER LONG TERM INSULIN USE (MULTI): ICD-10-CM

## 2025-01-10 DIAGNOSIS — E11.9 TYPE 2 DIABETES MELLITUS WITHOUT COMPLICATION, UNSPECIFIED WHETHER LONG TERM INSULIN USE (MULTI): Primary | ICD-10-CM

## 2025-01-10 RX ORDER — DULAGLUTIDE 3 MG/.5ML
3 INJECTION, SOLUTION SUBCUTANEOUS WEEKLY
Qty: 6 ML | Refills: 1 | Status: SHIPPED | OUTPATIENT
Start: 2025-01-10 | End: 2026-01-10

## 2025-01-10 NOTE — TELEPHONE ENCOUNTER
Patient called in for a refill on     Trulicity 1.5mg     But she thinks that she is to go back up to trulicity 3mg now but unsure is that is what you wanted     [facility].

## 2025-01-11 RX ORDER — DULAGLUTIDE 3 MG/.5ML
3 INJECTION, SOLUTION SUBCUTANEOUS WEEKLY
Qty: 6 ML | Refills: 1 | Status: SHIPPED | OUTPATIENT
Start: 2025-01-11

## 2025-02-03 DIAGNOSIS — Z79.4 TYPE 2 DIABETES MELLITUS WITH STAGE 3A CHRONIC KIDNEY DISEASE, WITH LONG-TERM CURRENT USE OF INSULIN (MULTI): ICD-10-CM

## 2025-02-03 DIAGNOSIS — N18.31 TYPE 2 DIABETES MELLITUS WITH STAGE 3A CHRONIC KIDNEY DISEASE, WITH LONG-TERM CURRENT USE OF INSULIN (MULTI): ICD-10-CM

## 2025-02-03 DIAGNOSIS — E11.22 TYPE 2 DIABETES MELLITUS WITH STAGE 3A CHRONIC KIDNEY DISEASE, WITH LONG-TERM CURRENT USE OF INSULIN (MULTI): ICD-10-CM

## 2025-02-03 RX ORDER — INSULIN HUMAN 100 [IU]/ML
INJECTION, SUSPENSION SUBCUTANEOUS
Qty: 40 ML | Refills: 1 | Status: SHIPPED | OUTPATIENT
Start: 2025-02-03

## 2025-03-11 LAB
ALBUMIN SERPL-MCNC: 4.1 G/DL (ref 3.6–5.1)
ALP SERPL-CCNC: 102 U/L (ref 37–153)
ALT SERPL-CCNC: 14 U/L (ref 6–29)
ANION GAP SERPL CALCULATED.4IONS-SCNC: 15 MMOL/L (CALC) (ref 7–17)
AST SERPL-CCNC: 15 U/L (ref 10–35)
BILIRUB SERPL-MCNC: 0.5 MG/DL (ref 0.2–1.2)
BUN SERPL-MCNC: 31 MG/DL (ref 7–25)
CALCIUM SERPL-MCNC: 9.6 MG/DL (ref 8.6–10.4)
CHLORIDE SERPL-SCNC: 100 MMOL/L (ref 98–110)
CO2 SERPL-SCNC: 25 MMOL/L (ref 20–32)
CREAT SERPL-MCNC: 1.59 MG/DL (ref 0.6–1)
EGFRCR SERPLBLD CKD-EPI 2021: 33 ML/MIN/1.73M2
EST. AVERAGE GLUCOSE BLD GHB EST-MCNC: 209 MG/DL
EST. AVERAGE GLUCOSE BLD GHB EST-SCNC: 11.6 MMOL/L
GLUCOSE SERPL-MCNC: 170 MG/DL (ref 65–139)
HBA1C MFR BLD: 8.9 % OF TOTAL HGB
POTASSIUM SERPL-SCNC: 4.5 MMOL/L (ref 3.5–5.3)
PROT SERPL-MCNC: 7.2 G/DL (ref 6.1–8.1)
SODIUM SERPL-SCNC: 140 MMOL/L (ref 135–146)

## 2025-03-13 ENCOUNTER — APPOINTMENT (OUTPATIENT)
Dept: PRIMARY CARE | Facility: CLINIC | Age: 80
End: 2025-03-13
Payer: MEDICARE

## 2025-03-14 ENCOUNTER — TELEPHONE (OUTPATIENT)
Dept: PRIMARY CARE | Facility: CLINIC | Age: 80
End: 2025-03-14

## 2025-03-14 ENCOUNTER — APPOINTMENT (OUTPATIENT)
Dept: ENDOCRINOLOGY | Facility: CLINIC | Age: 80
End: 2025-03-14
Payer: MEDICARE

## 2025-03-14 VITALS
DIASTOLIC BLOOD PRESSURE: 70 MMHG | HEIGHT: 66 IN | RESPIRATION RATE: 16 BRPM | WEIGHT: 178.8 LBS | HEART RATE: 76 BPM | SYSTOLIC BLOOD PRESSURE: 120 MMHG | BODY MASS INDEX: 28.73 KG/M2

## 2025-03-14 DIAGNOSIS — E78.5 HYPERLIPIDEMIA, UNSPECIFIED HYPERLIPIDEMIA TYPE: ICD-10-CM

## 2025-03-14 DIAGNOSIS — E11.9 TYPE 2 DIABETES MELLITUS WITHOUT COMPLICATION, UNSPECIFIED WHETHER LONG TERM INSULIN USE (MULTI): Primary | ICD-10-CM

## 2025-03-14 DIAGNOSIS — I10 HYPERTENSION, UNSPECIFIED TYPE: ICD-10-CM

## 2025-03-14 PROCEDURE — G2211 COMPLEX E/M VISIT ADD ON: HCPCS | Performed by: INTERNAL MEDICINE

## 2025-03-14 PROCEDURE — 99214 OFFICE O/P EST MOD 30 MIN: CPT | Performed by: INTERNAL MEDICINE

## 2025-03-14 PROCEDURE — 1160F RVW MEDS BY RX/DR IN RCRD: CPT | Performed by: INTERNAL MEDICINE

## 2025-03-14 PROCEDURE — 3074F SYST BP LT 130 MM HG: CPT | Performed by: INTERNAL MEDICINE

## 2025-03-14 PROCEDURE — 1036F TOBACCO NON-USER: CPT | Performed by: INTERNAL MEDICINE

## 2025-03-14 PROCEDURE — 1159F MED LIST DOCD IN RCRD: CPT | Performed by: INTERNAL MEDICINE

## 2025-03-14 PROCEDURE — 3078F DIAST BP <80 MM HG: CPT | Performed by: INTERNAL MEDICINE

## 2025-03-14 ASSESSMENT — ENCOUNTER SYMPTOMS
VOMITING: 0
NAUSEA: 0
COUGH: 0
DIARRHEA: 0
PALPITATIONS: 0
FEVER: 0
HEADACHES: 0
FATIGUE: 0
SHORTNESS OF BREATH: 0
CHILLS: 0

## 2025-03-14 NOTE — PROGRESS NOTES
Endocrinology: Follow up visit  Subjective   Patient ID: Verenice Ruiz is a 79 y.o. female who presents for Diabetes (Type 2 ), Hyperlipidemia, and Hypertension.    PCP: Nyla Khan MD    HPI  Dm2  Off metformin due to cri  Trulicity 3 mg weekly   Nph 30 units in pm  Meals 22     Since last visit able to start back on trulicity and up to 3 mg  Sugars much improved.  Having some lows in early am    Checks sugars 4x a day  Injects insulin 3x a day  Currently utilizing cgm to check sugars    Review of Systems   Constitutional:  Negative for chills, fatigue and fever.   Respiratory:  Negative for cough and shortness of breath.    Cardiovascular:  Negative for chest pain and palpitations.   Gastrointestinal:  Negative for diarrhea, nausea and vomiting.   Neurological:  Negative for headaches.       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    amLODIPine (NORVASC) 10 mg, oral, Daily    blood sugar diagnostic (OneTouch Verio test strips) strip Use bidUSE TO TEST THREE TIMES DAILY    dulaglutide (TRULICITY) 1.5 mg, subcutaneous, Weekly    flash glucose scanning reader (FreeStyle Misty 2 [place]) misc Use as instructed    flash glucose sensor kit (FreeStyle Misty 2 Sensor) kit Use as instructed,change every 2 wks    insulin lispro (HUMALOG) 6 Units, subcutaneous, 2 times daily, Take as directed per insulin instructions.    insulin NPH, Isophane,  "(HumuLIN N NPH U-100 Insulin) 100 unit/mL injection Take as directed per insulin instructions.INJECT 30 UNITS IN THE MORNING AND 14 UNITS IN THE EVENING    insulin syringe-needle U-100 (BD Insulin Syringe Ultra-Fine) 31G X 5/16\" 0.5 mL syringe Use as instructedUSE WITH INSULIN TWICE A DAY    lisinopriL-hydrochlorothiazide 20-25 mg tablet 1 tablet, oral, Daily    pen needle, diabetic (BD Milka 2nd Gen Pen Needle) 32 gauge x 5/32\" needle Twice a day    pravastatin (PRAVACHOL) 80 mg, oral, Daily    Trulicity 3 mg, subcutaneous, Weekly    Trulicity 3 mg, subcutaneous, Weekly, Use as directed.        Allergies   Allergen Reactions    Allerg Xt,D.Farinae-D.Pteronys Other    Cat Hair Standardized Allergenic Extract Other    Rosuvastatin Other     Severe myalgia    Simvastatin Other     Severe shoulder upper arm pain bilateral        Objective   Vitals:    03/14/25 1243   BP: 120/70   Pulse: 76   Resp: 16      Vitals:    03/14/25 1243   Weight: 81.1 kg (178 lb 12.8 oz)      Body mass index is 28.86 kg/m².   Physical Exam  Constitutional:       Appearance: Normal appearance. She is overweight.   HENT:      Head: Normocephalic and atraumatic.   Neck:      Thyroid: No thyroid mass, thyromegaly or thyroid tenderness.   Cardiovascular:      Rate and Rhythm: Normal rate and regular rhythm.      Heart sounds: No murmur heard.     No gallop.   Pulmonary:      Effort: Pulmonary effort is normal.      Breath sounds: Normal breath sounds.   Abdominal:      Palpations: Abdomen is soft.      Comments: benign   Neurological:      General: No focal deficit present.      Mental Status: She is alert and oriented to person, place, and time.      Deep Tendon Reflexes: Reflexes are normal and symmetric.   Psychiatric:         Behavior: Behavior is cooperative.         Labs:  Lab Results   Component Value Date    HGBA1C 8.9 (H) 03/10/2025    LDLDIRECT 105 04/19/2018    TSH 1.19 07/31/2024      No results found for: \"PR1\", \"THYROIDPAB\", \"TSI\" "     Assessment/Plan   Assessment & Plan  Type 2 diabetes mellitus without complication, unspecified whether long term insulin use (Multi)    Sugars reviewed  Cut back nph to 26 units  overall doing much improved  Hyperlipidemia, unspecified hyperlipidemia type    Stable on statin  Hypertension, unspecified type    Bp excellent      Electronically signed by:  Isabel Villegas MD 03/14/25 12:49 PM

## 2025-04-12 DIAGNOSIS — N18.32 HYPERTENSIVE KIDNEY DISEASE WITH STAGE 3B CHRONIC KIDNEY DISEASE (MULTI): ICD-10-CM

## 2025-04-12 DIAGNOSIS — I12.9 HYPERTENSIVE KIDNEY DISEASE WITH STAGE 3B CHRONIC KIDNEY DISEASE (MULTI): ICD-10-CM

## 2025-04-13 RX ORDER — AMLODIPINE BESYLATE 10 MG/1
10 TABLET ORAL DAILY
Qty: 30 TABLET | Refills: 3 | Status: SHIPPED | OUTPATIENT
Start: 2025-04-13

## 2025-06-11 DIAGNOSIS — E78.5 HYPERLIPIDEMIA, UNSPECIFIED HYPERLIPIDEMIA TYPE: ICD-10-CM

## 2025-06-11 RX ORDER — PRAVASTATIN SODIUM 80 MG/1
80 TABLET ORAL DAILY
Qty: 90 TABLET | Refills: 3 | Status: SHIPPED | OUTPATIENT
Start: 2025-06-11

## 2025-06-15 DIAGNOSIS — E11.9 TYPE 2 DIABETES MELLITUS WITHOUT COMPLICATION, UNSPECIFIED WHETHER LONG TERM INSULIN USE: ICD-10-CM

## 2025-06-15 RX ORDER — INSULIN LISPRO 100 U/ML
INJECTION, SOLUTION SUBCUTANEOUS
Qty: 50 ML | Refills: 1 | Status: SHIPPED | OUTPATIENT
Start: 2025-06-15

## 2025-06-15 RX ORDER — DULAGLUTIDE 3 MG/.5ML
3 INJECTION, SOLUTION SUBCUTANEOUS WEEKLY
Qty: 6 ML | Refills: 1 | Status: SHIPPED | OUTPATIENT
Start: 2025-06-15

## 2025-07-09 DIAGNOSIS — I12.9 HYPERTENSIVE KIDNEY DISEASE WITH STAGE 3B CHRONIC KIDNEY DISEASE (MULTI): ICD-10-CM

## 2025-07-09 DIAGNOSIS — N18.32 HYPERTENSIVE KIDNEY DISEASE WITH STAGE 3B CHRONIC KIDNEY DISEASE (MULTI): ICD-10-CM

## 2025-07-09 RX ORDER — AMLODIPINE BESYLATE 10 MG/1
10 TABLET ORAL DAILY
Qty: 90 TABLET | Refills: 2 | Status: SHIPPED | OUTPATIENT
Start: 2025-07-09

## 2025-09-03 LAB
ALBUMIN SERPL-MCNC: 4.2 G/DL (ref 3.6–5.1)
ALP SERPL-CCNC: 107 U/L (ref 37–153)
ALT SERPL-CCNC: 15 U/L (ref 6–29)
ANION GAP SERPL CALCULATED.4IONS-SCNC: 10 MMOL/L (CALC) (ref 7–17)
AST SERPL-CCNC: 13 U/L (ref 10–35)
BILIRUB SERPL-MCNC: 0.5 MG/DL (ref 0.2–1.2)
BUN SERPL-MCNC: 28 MG/DL (ref 7–25)
CALCIUM SERPL-MCNC: 9.6 MG/DL (ref 8.6–10.4)
CHLORIDE SERPL-SCNC: 99 MMOL/L (ref 98–110)
CHOLEST SERPL-MCNC: 165 MG/DL
CHOLEST/HDLC SERPL: 2.6 (CALC)
CO2 SERPL-SCNC: 27 MMOL/L (ref 20–32)
CREAT SERPL-MCNC: 1.51 MG/DL (ref 0.6–1)
EGFRCR SERPLBLD CKD-EPI 2021: 35 ML/MIN/1.73M2
EST. AVERAGE GLUCOSE BLD GHB EST-MCNC: 197 MG/DL
EST. AVERAGE GLUCOSE BLD GHB EST-SCNC: 10.9 MMOL/L
GLUCOSE SERPL-MCNC: 321 MG/DL (ref 65–99)
HBA1C MFR BLD: 8.5 %
HDLC SERPL-MCNC: 64 MG/DL
LDLC SERPL CALC-MCNC: 78 MG/DL (CALC)
NONHDLC SERPL-MCNC: 101 MG/DL (CALC)
POTASSIUM SERPL-SCNC: 4.3 MMOL/L (ref 3.5–5.3)
PROT SERPL-MCNC: 7.3 G/DL (ref 6.1–8.1)
SODIUM SERPL-SCNC: 136 MMOL/L (ref 135–146)
TRIGL SERPL-MCNC: 125 MG/DL

## 2025-09-05 ENCOUNTER — APPOINTMENT (OUTPATIENT)
Facility: CLINIC | Age: 80
End: 2025-09-05
Payer: MEDICARE

## 2025-09-05 ASSESSMENT — PAIN SCALES - GENERAL: PAINLEVEL_OUTOF10: 0-NO PAIN

## 2025-09-05 ASSESSMENT — ENCOUNTER SYMPTOMS
VOMITING: 0
NAUSEA: 0
CHILLS: 0
DIARRHEA: 0
COUGH: 0
PALPITATIONS: 0
LOSS OF SENSATION IN FEET: 0
SHORTNESS OF BREATH: 0
FATIGUE: 0
HEADACHES: 0
OCCASIONAL FEELINGS OF UNSTEADINESS: 1
FEVER: 0
DEPRESSION: 0

## 2025-09-05 ASSESSMENT — PATIENT HEALTH QUESTIONNAIRE - PHQ9
1. LITTLE INTEREST OR PLEASURE IN DOING THINGS: NOT AT ALL
2. FEELING DOWN, DEPRESSED OR HOPELESS: NOT AT ALL
SUM OF ALL RESPONSES TO PHQ9 QUESTIONS 1 AND 2: 0

## 2025-09-10 ENCOUNTER — APPOINTMENT (OUTPATIENT)
Dept: PRIMARY CARE | Facility: CLINIC | Age: 80
End: 2025-09-10
Payer: MEDICARE

## 2026-01-16 ENCOUNTER — APPOINTMENT (OUTPATIENT)
Facility: CLINIC | Age: 81
End: 2026-01-16
Payer: MEDICARE

## 2026-06-19 ENCOUNTER — APPOINTMENT (OUTPATIENT)
Facility: CLINIC | Age: 81
End: 2026-06-19
Payer: MEDICARE

## 2026-10-09 ENCOUNTER — APPOINTMENT (OUTPATIENT)
Facility: CLINIC | Age: 81
End: 2026-10-09
Payer: MEDICARE